# Patient Record
Sex: FEMALE | Race: BLACK OR AFRICAN AMERICAN | HISPANIC OR LATINO | Employment: FULL TIME | URBAN - METROPOLITAN AREA
[De-identification: names, ages, dates, MRNs, and addresses within clinical notes are randomized per-mention and may not be internally consistent; named-entity substitution may affect disease eponyms.]

---

## 2023-02-15 ENCOUNTER — TELEPHONE (OUTPATIENT)
Dept: FAMILY MEDICINE | Facility: CLINIC | Age: 73
End: 2023-02-15

## 2023-02-15 NOTE — TELEPHONE ENCOUNTER
"[employer]  Transfer Triage Note    Date of call: February 15, 2023  Time of call: 5:49 PM    Current Patient Location: [facility]  Current Level of Care: ED    Vitals: /80 HR: 70s O2 Sats: 96% on RA  Diagnosis: Septic symphysis pubis?  Is COVID-19 a concern? No  Reason for requested transfer: Procedure can be done here and not at referring hospital   Isolation Needs: None    Outside Records: Not available  Additional records may be faxed to [phone].    Transfer accepted: No.    Rationale for declining transfer or suggested follow-up: Will follow-up for MR results and discuss with IR as below    Recommendations for Management and Stabilization: Given    Additional Comments:     73yo relatively healthy Arabic-speaking female with PMHx of hypothyroidism, osteopetrosis, and obesity who presented to ED with inability to bear weight.  Hip pain developed in January, intermittent over the past month.  Developed worsening and persistent pain in pelvis on 2/12, radiating down bilateral medial thighs. Has been unable to work or walk for the past 4 days.  No fevers/sweats/chills, no weakness or sensory changes in legs, no bowel/bladder incontinence/retention.  Labs notable for normal WBC, elevated CRP 3.1, elevated ESR in 60s; otherwise unremarkable. CT showed erosive changes at pubic symphysis with an enhancing fluid collection (13mm x 12mm).  Concern for inflammatory/infectious etiology per radiology.     Briefly reviewed literature for \"septic pubic symphysis,\" most commonly seen in young female athletes, far less common in this age group outside urogenital surgery (pt has remote hx of hysterectomy). Discussed with Dr Marino of Orthopedics, who commented that he had not run across such an infection in his experience and is uncertain that a washout would be indicated; may start with CT guided IR fluid sample, particularly given relatively small collection.  No bed available for jayde" >24h, outside facility will plan to admit patient, start broad-spectrum antibiotics, and get MR in the AM to better characterize possible infection.      Daytime SOC triage to [facility] back on 2/16 for MR results and possible discussion with IR regarding next steps in management.     Jens Matthews MD  Internal Medicine and Pediatrics  P: [phone]      Addendum 2/16/2023 11:21AM  Discussed with Dr. De Los Santos ([facility]) and Dr. Escamilla ([facility]) about options given the MR finding of severe arthritis vs septic pubis symphysis. Fluid collection again seen and around 1cm unchanged in size. Per Dr. De Los Santos could continue antibiotics for now and pursue outpatient sampling (CT-guided with IR) versus transfer to [facility] for inpatient IR sampling. Unfortunately images were not able to be shared between [facility] system and ours; spoke again with Dr. Escamilla and she elected to treat with antibiotics alone, discharge if there is clinical improvement (in pain, ESR, CRP), with plan to see ortho as outpatient for further evaluation. At this time no plan for transfer to [facility].  Eliud Arnold, DO

## 2023-02-17 ENCOUNTER — MEDICAL CORRESPONDENCE (OUTPATIENT)
Dept: HEALTH INFORMATION MANAGEMENT | Facility: CLINIC | Age: 73
End: 2023-02-17

## 2023-02-20 ENCOUNTER — TRANSCRIBE ORDERS (OUTPATIENT)
Dept: OTHER | Age: 73
End: 2023-02-20

## 2023-02-20 DIAGNOSIS — M00.9 SEPTIC ARTHRITIS, DUE TO UNSPECIFIED ORGANISM, SEPTIC ARTHRITIS OF UNSPECIFIED LOCATION (H): Primary | ICD-10-CM

## 2023-02-23 ENCOUNTER — TELEPHONE (OUTPATIENT)
Dept: ORTHOPEDICS | Facility: CLINIC | Age: 73
End: 2023-02-23

## 2023-02-23 NOTE — TELEPHONE ENCOUNTER
Attempted to reach patient, her daughter is unable to drive her here tomorrow.  She will keep her appointment for 3/1/23 with Dr. De Los Santos.  Patient's daughter stated that patient is doing better.

## 2023-02-23 NOTE — TELEPHONE ENCOUNTER
Records received    February 23, 2023 7:45 AM  [id number]   Facility  [facility]  [address] [address]   Ph. [phone]   Outcome Imaging disc received addressed to Ortho, no reports with disc. Uploaded to PACS, no appt scheduled at this time:  1/23/23 XR HIP RT & LT  2/15/23 XR L-Spine & CT ABD PELVIS   2/16/23 MR PELVIS

## 2023-02-23 NOTE — TELEPHONE ENCOUNTER
Hello,  Im with ortho con.  Pt has an urgent referral to Dr. De Los Santos.  She is scheduled for 3/1.  Is this soon enough?  Is Dr. De Los Santos the provider she should see?  Thank you.

## 2023-02-24 NOTE — TELEPHONE ENCOUNTER
Action February 24, 2023 10:45 AM MT   Action Taken Called patient's daughter, whom helps her mother coordinate emdical care. Per daughter, her mother has no Hx of back/hip surgery and no Hx of injections. Daughter gave VBOK to update care everywhere.      DIAGNOSIS: BILATERAL HIP PAIN - LOW BACK PAIN  Septic arthritis, due to unspecified organism, septic arthritis of unspecified location (H) [M00.9]  - Primary    APPOINTMENT DATE: 03/01/2023   NOTES STATUS DETAILS   OFFICE NOTE from referring provider Jessica Montalvo MD - [facility]   OFFICE NOTE from other specialist Care Everywhere    DISCHARGE REPORT from the ER Care Everywhere 02/15/2023 -  [facility] ED   OPERATIVE REPORT Care Everywhere 06/21/2019 - Hysterectomy   MEDICATION LIST Care Everywhere    LABS     MRI PACS [facility]:  2/16/23 MR PELVIS    CT SCAN PACS [facility]:  2/15/23 XR L-Spine & CT ABD PELVIS    XRAYS (IMAGES & REPORTS) PACS [facility]:  1/23/23 XR HIP RT & LT  2/15/23 XR L-Spine

## 2023-03-01 ENCOUNTER — PRE VISIT (OUTPATIENT)
Dept: ORTHOPEDICS | Facility: CLINIC | Age: 73
End: 2023-03-01

## 2023-03-01 ENCOUNTER — OFFICE VISIT (OUTPATIENT)
Dept: ORTHOPEDICS | Facility: CLINIC | Age: 73
End: 2023-03-01
Payer: COMMERCIAL

## 2023-03-01 DIAGNOSIS — M70.61 TROCHANTERIC BURSITIS OF BOTH HIPS: Primary | ICD-10-CM

## 2023-03-01 DIAGNOSIS — M70.62 TROCHANTERIC BURSITIS OF BOTH HIPS: Primary | ICD-10-CM

## 2023-03-01 DIAGNOSIS — M00.9 SEPTIC ARTHRITIS, DUE TO UNSPECIFIED ORGANISM, SEPTIC ARTHRITIS OF UNSPECIFIED LOCATION (H): ICD-10-CM

## 2023-03-01 PROCEDURE — 99204 OFFICE O/P NEW MOD 45 MIN: CPT | Performed by: ORTHOPAEDIC SURGERY

## 2023-03-01 RX ORDER — FUROSEMIDE 20 MG
1 TABLET ORAL
COMMUNITY
Start: 2023-02-21

## 2023-03-01 RX ORDER — ACETAMINOPHEN 500 MG
1000 TABLET ORAL
COMMUNITY

## 2023-03-01 RX ORDER — ALENDRONATE SODIUM 35 MG/1
35 TABLET ORAL
COMMUNITY
Start: 2022-09-22 | End: 2024-08-14

## 2023-03-01 RX ORDER — IBUPROFEN 200 MG
TABLET ORAL
COMMUNITY
Start: 2023-02-18

## 2023-03-01 RX ORDER — SODIUM PHOSPHATE,MONO-DIBASIC 19G-7G/118
2 ENEMA (ML) RECTAL
COMMUNITY

## 2023-03-01 RX ORDER — B-COMPLEX WITH VITAMIN C
1 TABLET ORAL EVERY MORNING
COMMUNITY
Start: 2022-09-22

## 2023-03-01 RX ORDER — CALCIUM CARBONATE/VITAMIN D3 600 MG-10
1 TABLET ORAL
COMMUNITY
Start: 2022-09-22 | End: 2023-09-22

## 2023-03-01 RX ORDER — HYDROCODONE BITARTRATE AND ACETAMINOPHEN 5; 325 MG/1; MG/1
1 TABLET ORAL
COMMUNITY
Start: 2023-02-18 | End: 2024-08-14

## 2023-03-01 RX ORDER — PANTOPRAZOLE SODIUM 40 MG/1
40 TABLET, DELAYED RELEASE ORAL
COMMUNITY
Start: 2023-02-19 | End: 2023-03-21

## 2023-03-01 RX ORDER — LEVOTHYROXINE SODIUM 100 UG/1
TABLET ORAL
COMMUNITY
Start: 2022-09-22

## 2023-03-01 RX ORDER — DOXYCYCLINE 100 MG/1
CAPSULE ORAL
COMMUNITY
Start: 2023-02-18 | End: 2024-08-14

## 2023-03-01 NOTE — PROGRESS NOTES
This patient was hospitalized recently for an anterior midline pain and was found to have a fluid collection next to the pubic symphysis.  She was treated with IV antibiotics and is still on oral antibiotics for at least another 2 days.  Her anterior midline symphysis pain has largely resolved.  Today she is mostly complaining of pain over both trochanters and a long-term history of knee discomfort.  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    On examination today she is alert oriented has a normal mood and affect and is in no acute distress.  She is able to stand and walk without assistance although has some discomfort.  She is tender over the greater trochanters and proximal posterior aspect.    I reviewed her imaging studies which show fluid collection just inferior to the pubic symphysis.  I also reviewed labs showing increased but rapidly dropping C-reactive protein while she was hospitalized more than 10 days ago.    The plan is for her to complete her antibiotics.  I would like to have a phone conversation with her at least in 2 weeks to confirm that her symptoms have resolved.  I have given her a work note for her to be off work for 2 weeks then half time for 2 weeks before returning to full-time.  I have answered all of the family's questions.

## 2023-03-01 NOTE — LETTER
Return to Work  2023     Seen today: Yes    Patient:  Marixa Liriano  :   1950  MRN:     [id number]  Physician:    PASQUALE KULKARNI  VIDEO,     Marixa Liriano may return to work on Date: March 15, 2023 at 4 hours/day.  On 2023, she may return to full hours.  I saw her today in my clinic and she is recovering from her recent hospitalization but still needs medical treatment and therapy..    Sincerely,              Electronically signed by Pasquale Kulkarni MD

## 2023-03-01 NOTE — NURSING NOTE
Reason For Visit:   Chief Complaint   Patient presents with     Consult     Bilateral hip pain referred by Dr. Jessica Callejas at [facility] // pt reports bilateral legs swelling        There were no vitals taken for this visit.-pt in wheel chair      Pain Assessment  Patient Currently in Pain: Yes  0-10 Pain Scale: 5  Primary Pain Location: Hip (bilateral)      Gonzalo Ziegler ATC

## 2023-03-08 ENCOUNTER — THERAPY VISIT (OUTPATIENT)
Dept: PHYSICAL THERAPY | Facility: CLINIC | Age: 73
End: 2023-03-08
Attending: ORTHOPAEDIC SURGERY
Payer: COMMERCIAL

## 2023-03-08 DIAGNOSIS — M70.62 TROCHANTERIC BURSITIS OF BOTH HIPS: ICD-10-CM

## 2023-03-08 DIAGNOSIS — M70.61 TROCHANTERIC BURSITIS OF BOTH HIPS: ICD-10-CM

## 2023-03-08 DIAGNOSIS — M25.552 BILATERAL HIP PAIN: ICD-10-CM

## 2023-03-08 DIAGNOSIS — M25.551 BILATERAL HIP PAIN: ICD-10-CM

## 2023-03-08 PROCEDURE — 97161 PT EVAL LOW COMPLEX 20 MIN: CPT | Mod: GP | Performed by: PHYSICAL THERAPIST

## 2023-03-08 PROCEDURE — 97110 THERAPEUTIC EXERCISES: CPT | Mod: GP | Performed by: PHYSICAL THERAPIST

## 2023-03-08 NOTE — PROGRESS NOTES
Physical Therapy Initial Evaluation  Subjective:  The history is provided by the patient. A  was used.   Patient Health History  Marixa Liriano being seen for bilateral hip pain.     Date of Onset: 3-8-22.   Problem occurred: unknown   Pain is reported as 4/10 on pain scale.  General health as reported by patient is fair.  Pertinent medical history includes: menopausal, osteoarthritis and thyroid problems.   Red flags:  None as reported by patient.  Medical allergies: none.   Surgeries include:  Orthopedic surgery.    Current medications:  Anti-inflammatory, bone density and thyroid medication.    Current occupation is .   Primary job tasks include:  Prolonged standing and repetitive tasks.                  Therapist Generated HPI Evaluation  Problem details: Has had B hip pain for more than one year. MD orders dated 3-1-23. Over past 2 months the pain has worsened. Per chart review: Urgent care visit on 1-19-23 for B hip pain. ED visit and admitted to hospital 2-15-23 for B hip pain, unable to walk. (Severe pain in pubic symphysis area). MRI showed septic joint. Pt placed on antibiotics and pain meds which helped.  She works standing on her feet greater than 8 hours. The pain is lateral hip, groin, pubic symphysis, L>R anterior and posterior thighs, lower leg L>R, N/T in L>R feet. Walking with cane and standing tolerance is 5-10 minutes.         Type of problem:  Bilateral hips.    This is a chronic condition.  Condition occurred with:  Insidious onset.  Where condition occurred: for unknown reasons.  Patient reports pain:  Posterior and groin.  Pain is described as aching and is intermittent.  Pain radiates to:  Thigh and lower leg (L>R). Pain is worse during the day.  Since onset symptoms are unchanged.  Associated symptoms:  Numbness and tingling (L>R feet). Symptoms are exacerbated by walking, standing, ascending stairs, bending/squatting and lying on extremity  and relieved by  rest.      Restrictions due to condition include:  Currently not working due to present treatment (until 3-15-23).  Barriers include:  None as reported by patient.                        Objective:    Gait:    Assistive Devices:  Cane  Deviations:  General Deviations:  Felipa decr, stance time decr and stride length decr               Lumbar/SI Evaluation  ROM:    AROM Lumbar:   Flexion:          Hands to floor  Ext:                    75%   Side Bend:        Left:     Right:   Rotation:           Left:     Right:   Side Glide:        Left:  25%    Right:  25%                                                              Hip Evaluation  Hip PROM:  Hip PROM:  Left Hip:    Normal (PDM)  Right Hip:  Normal (PDM)                          Hip Strength:  Hip Strength:    Left:    Normal (pain in lateral hips and pubis symphysis)  Right:  Normal (pain in lateral hips and pubis symphysis)                          Hip Special Testing:      Left hip negative for the following special tests:  Gayle; Fadir/Labrum or SLR  Right hip negative for the following special tests:  Gayle; Fadir/Labrum or SLR                   Lakisha Lumbar Evaluation    Posture:  Sitting: poor  Standing: fair  Lordosis: WNL  Lateral Shift: no  Correction of Posture: better      Test Movements:    EIS: During: no effect    Repeat EIS: During: no effect  After: better  Mechanical Response: IncROM                                                     ROS    Assessment/Plan:    Patient is a 72 year old female with both sides hip complaints.  KATE decreased pain and increased motion. Will trial 5x/day over next week and assess sx's and function. She lives out of town and hopes to get several sessions in before she has to leave.    Patient has the following significant findings with corresponding treatment plan.                Diagnosis 1:  B hip pain  Pain -  manual therapy, self management, education, directional preference exercise and home  program  Decreased ROM/flexibility - manual therapy and therapeutic exercise  Impaired gait - gait training  Decreased function - therapeutic activities  Impaired posture - neuro re-education    Therapy Evaluation Codes:   Cumulative Therapy Evaluation is: Low complexity.    Previous and current functional limitations:  (See Goal Flow Sheet for this information)    Short term and Long term goals: (See Goal Flow Sheet for this information)     Communication ability:  Patient has an  for communication clarity.  Treatment Explanation - The following has been discussed with the patient:   RX ordered/plan of care  Anticipated outcomes  Possible risks and side effects  This patient would benefit from PT intervention to resume normal activities.   Rehab potential is good.    Frequency:  2 X week, once daily  Duration:  for 2 weeks  Discharge Plan:    Independent in home treatment program.    Please refer to the daily flowsheet for treatment today, total treatment time and time spent performing 1:1 timed codes.

## 2023-03-15 ENCOUNTER — VIRTUAL VISIT (OUTPATIENT)
Dept: ORTHOPEDICS | Facility: CLINIC | Age: 73
End: 2023-03-15
Payer: COMMERCIAL

## 2023-03-15 ENCOUNTER — THERAPY VISIT (OUTPATIENT)
Dept: PHYSICAL THERAPY | Facility: CLINIC | Age: 73
End: 2023-03-15
Payer: COMMERCIAL

## 2023-03-15 DIAGNOSIS — M00.9 SEPTIC ARTHRITIS, DUE TO UNSPECIFIED ORGANISM, SEPTIC ARTHRITIS OF UNSPECIFIED LOCATION (H): ICD-10-CM

## 2023-03-15 DIAGNOSIS — M70.62 TROCHANTERIC BURSITIS OF BOTH HIPS: Primary | ICD-10-CM

## 2023-03-15 DIAGNOSIS — M25.551 BILATERAL HIP PAIN: Primary | ICD-10-CM

## 2023-03-15 DIAGNOSIS — M70.61 TROCHANTERIC BURSITIS OF BOTH HIPS: Primary | ICD-10-CM

## 2023-03-15 DIAGNOSIS — M25.552 BILATERAL HIP PAIN: Primary | ICD-10-CM

## 2023-03-15 PROCEDURE — 99213 OFFICE O/P EST LOW 20 MIN: CPT | Mod: 95 | Performed by: ORTHOPAEDIC SURGERY

## 2023-03-15 PROCEDURE — 97112 NEUROMUSCULAR REEDUCATION: CPT | Mod: GP | Performed by: PHYSICAL THERAPIST

## 2023-03-15 PROCEDURE — 97110 THERAPEUTIC EXERCISES: CPT | Mod: GP | Performed by: PHYSICAL THERAPIST

## 2023-03-15 PROCEDURE — 97140 MANUAL THERAPY 1/> REGIONS: CPT | Mod: GP | Performed by: PHYSICAL THERAPIST

## 2023-03-15 NOTE — LETTER
Date:March 16, 2023      Provider requested that no letter be sent. Do not send.       [facility]

## 2023-03-15 NOTE — PROGRESS NOTES
This patient is gradually improving and is looking forward to returning to work.  She is doing some physical therapy and still has some discomfort but pain overall is better in the hips and the front of the pelvis.    Over the phone she was alert and in no obvious distress.  She has many good questions and I answered all of her questions.  I reviewed her recent CT scan and she has no sign of hip arthritis but just this process in the symphysis pubis.  There is no abnormality over the greater trochanter to explain her bursitis or tendinopathy there.    I have advised her to continue with physical therapy for at least a month as she returns to work.  I have given her our number to call if she has additional problems or wants to be seen to discuss a possible greater trochanteric bursa injection.  I have answered all her questions today.    I spoke with this patient on the phone for approximately 8 minutes.  I spent another 5 minutes doing image review and charting

## 2023-03-15 NOTE — PROGRESS NOTES
Subjective:  HPI  Physical Exam                    Objective:  System    Physical Exam    General     ROS    Assessment/Plan:    PROGRESS  REPORT    Progress reporting period is from 3-8-23 to 3-15-23. Patient seen for 2 visits and is returning to her home town.       SUBJECTIVE  Subjective changes noted by patient:  Doing the KATE exercise and is much better. Able to walk without cane now. Still gets some pain in left hip to left heel but much better. Occasionaly in R leg, occasional tingling in bottom of left foot. She will be moving back to [place], [place] and will continue her care there.    Current Pain level: 1/10.      Initial Pain level: 4/10.   Changes in function:  Yes (See Goal flowsheet attached for changes in current functional level)  Adverse reaction to treatment or activity: None    OBJECTIVE  Changes noted in objective findings:  Yes,   LAROM: flex=hands to floor, ext=90% (improved from 75%).   Significant hypertonicity of B lumbar paraspinals that improved after STM and mobs.   Added strengthening.     ASSESSMENT/PLAN  Updated problem list and treatment plan: Diagnosis 1:  B hip pain  Pain -  manual therapy, self management, education, directional preference exercise and home program  Decreased ROM/flexibility - manual therapy and therapeutic exercise  Decreased function - therapeutic activities  Impaired posture - neuro re-education  STG/LTGs have been met or progress has been made towards goals:  Yes (See Goal flow sheet completed today.)  Assessment of Progress: The patient's condition is improving.  Patient is meeting short term goals and is progressing towards long term goals.  Self Management Plans:  Patient has been instructed in a home treatment program.  Patient  has been instructed in self management of symptoms.  I have re-evaluated this patient and find that the nature, scope, duration and intensity of the therapy is appropriate for the medical condition of the patient.  Marixa continues to  require the following intervention to meet STG and LTG's:  Patient will pursue PT care in [place].    Recommendations:  This patient is ready to be discharged from therapy and continue their home treatment program.  And will continue care in her home town.    Please refer to the daily flowsheet for treatment today, total treatment time and time spent performing 1:1 timed codes.

## 2023-03-15 NOTE — LETTER
3/15/2023         RE: Marixa Liriano  [address]  [place] [facility] [place] [address]        Dear Colleague,    Thank you for referring your patient, Marixa Liriano, to the [facility] [place]. Please see a copy of my visit note below.    This patient is gradually improving and is looking forward to returning to work.  She is doing some physical therapy and still has some discomfort but pain overall is better in the hips and the front of the pelvis.    Over the phone she was alert and in no obvious distress.  She has many good questions and I answered all of her questions.  I reviewed her recent CT scan and she has no sign of hip arthritis but just this process in the symphysis pubis.  There is no abnormality over the greater trochanter to explain her bursitis or tendinopathy there.    I have advised her to continue with physical therapy for at least a month as she returns to work.  I have given her our number to call if she has additional problems or wants to be seen to discuss a possible greater trochanteric bursa injection.  I have answered all her questions today.    I spoke with this patient on the phone for approximately 8 minutes.  I spent another 5 minutes doing image review and charting      Again, thank you for allowing me to participate in the care of your patient.        Sincerely,        Hugh De Los Santos MD

## 2023-03-24 ENCOUNTER — TELEPHONE (OUTPATIENT)
Dept: ORTHOPEDICS | Facility: CLINIC | Age: 73
End: 2023-03-24
Payer: COMMERCIAL

## 2023-03-24 NOTE — TELEPHONE ENCOUNTER
[facility] Call Center    Phone Message    May a detailed message be left on voicemail: yes     Reason for Call: Other: If patient needs PT can the order be faxed to [facility] fax [phone]. There is not an order so unsure      Action Taken: Other: ortho csc    Travel Screening: Not Applicable

## 2023-04-05 ENCOUNTER — TRANSFERRED RECORDS (OUTPATIENT)
Dept: HEALTH INFORMATION MANAGEMENT | Facility: CLINIC | Age: 73
End: 2023-04-05
Payer: COMMERCIAL

## 2023-10-13 ENCOUNTER — TRANSFERRED RECORDS (OUTPATIENT)
Dept: MULTI SPECIALTY CLINIC | Facility: CLINIC | Age: 73
End: 2023-10-13

## 2024-08-14 ENCOUNTER — OFFICE VISIT (OUTPATIENT)
Dept: FAMILY MEDICINE | Facility: CLINIC | Age: 74
End: 2024-08-14
Payer: COMMERCIAL

## 2024-08-14 ENCOUNTER — HOSPITAL ENCOUNTER (OUTPATIENT)
Dept: ULTRASOUND IMAGING | Facility: CLINIC | Age: 74
Discharge: HOME | End: 2024-08-14
Attending: PHYSICIAN ASSISTANT | Admitting: PHYSICIAN ASSISTANT
Payer: COMMERCIAL

## 2024-08-14 VITALS
HEART RATE: 84 BPM | RESPIRATION RATE: 18 BRPM | SYSTOLIC BLOOD PRESSURE: 112 MMHG | BODY MASS INDEX: 34.02 KG/M2 | HEIGHT: 63 IN | WEIGHT: 192 LBS | TEMPERATURE: 96.9 F | DIASTOLIC BLOOD PRESSURE: 67 MMHG | OXYGEN SATURATION: 98 %

## 2024-08-14 DIAGNOSIS — M79.605 LEFT LEG PAIN: Primary | ICD-10-CM

## 2024-08-14 DIAGNOSIS — R60.0 LOWER EXTREMITY EDEMA: ICD-10-CM

## 2024-08-14 PROCEDURE — 93971 EXTREMITY STUDY: CPT | Mod: LT

## 2024-08-14 PROCEDURE — 99203 OFFICE O/P NEW LOW 30 MIN: CPT | Performed by: PHYSICIAN ASSISTANT

## 2024-08-14 RX ORDER — FUROSEMIDE 20 MG
20 TABLET ORAL DAILY PRN
Qty: 30 TABLET | Refills: 0 | Status: SHIPPED | OUTPATIENT
Start: 2024-08-14

## 2024-08-14 ASSESSMENT — PATIENT HEALTH QUESTIONNAIRE - PHQ9
10. IF YOU CHECKED OFF ANY PROBLEMS, HOW DIFFICULT HAVE THESE PROBLEMS MADE IT FOR YOU TO DO YOUR WORK, TAKE CARE OF THINGS AT HOME, OR GET ALONG WITH OTHER PEOPLE: SOMEWHAT DIFFICULT
SUM OF ALL RESPONSES TO PHQ QUESTIONS 1-9: 4
SUM OF ALL RESPONSES TO PHQ QUESTIONS 1-9: 4

## 2024-08-14 ASSESSMENT — PAIN SCALES - GENERAL: PAINLEVEL: MODERATE PAIN (5)

## 2024-08-14 ASSESSMENT — ENCOUNTER SYMPTOMS: LEG PAIN: 1

## 2024-08-14 NOTE — PROGRESS NOTES
"HPI: Marixa is a Irish speaking female here with dtr Marixa for L calf pain x 8 days  Dtr serves as interpretor  She hx of recent travel to [facility] (home now for about 20 days)  Denies personal or FH blood clots  Walking makes pain worse and elevating legs helps  Denies chest pain or acute sob  Denies cough or fever  [place] in [place]  She has lasix on the list to be taken prn for leg swelling but she is not taking any of that.      Past Medical History:   Diagnosis Date    Hypothyroidism     Osteoarthritis      Past Surgical History:   Procedure Laterality Date    HYSTERECTOMY       Social History     Tobacco Use    Smoking status: Never    Smokeless tobacco: Never   Substance Use Topics    Alcohol use: Never     Current Outpatient Medications   Medication Sig Dispense Refill    acetaminophen (TYLENOL) 500 MG tablet Take 1,000 mg by mouth      diclofenac (VOLTAREN) 1 % topical gel APPLY 2-4 GRAMS TOPICALLY IF NEEDED IN THE MORNING NOON EVENING AND BEFORE BEDTIME. APPLY 2G TO JOINTS OF UPPER EXTREMITY AND 4G TO LOWER EX      furosemide (LASIX) 20 MG tablet Take 1 tablet by mouth daily at 2 pm      glucosamine-chondroitin 500-400 MG CAPS per capsule Take 2 capsules by mouth      ibuprofen (ADVIL/MOTRIN) 200 MG tablet TAKE 3 TABLETS (600 MG) BY MOUTH EVERY 8 HOURS FOR 5 DAYS.      levothyroxine (SYNTHROID/LEVOTHROID) 100 MCG tablet TAKE 1 TABLET (100 MCG) BY MOUTH IN THE MORNING.      VITAMIN  B COMPLEX tablet Take 1 tablet by mouth every morning       Allergies   Allergen Reactions    Naproxen Dizziness and Nausea and Vomiting    Other Drug Allergy (See Comments) Muscle Pain (Myalgia)       PHYSICAL EXAM:    /67 (BP Location: Right arm, Patient Position: Sitting, Cuff Size: Adult Regular)   Pulse 84   Temp 96.9  F (36.1  C) (Temporal)   Resp 18   Ht 1.6 m (5' 3\")   Wt 87.1 kg (192 lb)   LMP  (LMP Unknown)   SpO2 98%   BMI 34.01 kg/m      Patient appears " non toxic  Lungs: CTA bilat  Heart: RRR  L calf visibly larger than R and tight feeling  No erythema or palp cord  Normal gait    US neg for DVT (Call report)    Assessment and Plan:     (L41.051) Left leg pain  (primary encounter diagnosis)  Comment: no DVT. Recd she restart lasix 20mg every day x 5-7d, wearing compression knee high stocking and elevate legs. Return for recheck one week.  Plan: US Lower Extremity Venous Duplex Left              Anel Giron PA-C

## 2024-08-14 NOTE — RESULT ENCOUNTER NOTE
Discussed with pt's dtr who was her interpretor at the visit.  Recd lasix, compression hose and leg elevation

## 2024-10-18 ENCOUNTER — OFFICE VISIT (OUTPATIENT)
Dept: FAMILY MEDICINE | Facility: CLINIC | Age: 74
End: 2024-10-18
Payer: MEDICARE

## 2024-10-18 ENCOUNTER — HOSPITAL ENCOUNTER (EMERGENCY)
Facility: CLINIC | Age: 74
Discharge: HOME | End: 2024-10-18
Attending: EMERGENCY MEDICINE | Admitting: EMERGENCY MEDICINE
Payer: COMMERCIAL

## 2024-10-18 ENCOUNTER — APPOINTMENT (OUTPATIENT)
Dept: ULTRASOUND IMAGING | Facility: CLINIC | Age: 74
End: 2024-10-18
Attending: EMERGENCY MEDICINE
Payer: COMMERCIAL

## 2024-10-18 ENCOUNTER — APPOINTMENT (OUTPATIENT)
Dept: CT IMAGING | Facility: CLINIC | Age: 74
End: 2024-10-18
Attending: EMERGENCY MEDICINE
Payer: COMMERCIAL

## 2024-10-18 ENCOUNTER — ANCILLARY PROCEDURE (OUTPATIENT)
Dept: GENERAL RADIOLOGY | Facility: CLINIC | Age: 74
End: 2024-10-18
Attending: PHYSICIAN ASSISTANT
Payer: COMMERCIAL

## 2024-10-18 VITALS
OXYGEN SATURATION: 99 % | HEART RATE: 80 BPM | RESPIRATION RATE: 14 BRPM | SYSTOLIC BLOOD PRESSURE: 146 MMHG | TEMPERATURE: 97.9 F | DIASTOLIC BLOOD PRESSURE: 76 MMHG

## 2024-10-18 VITALS
TEMPERATURE: 98.1 F | SYSTOLIC BLOOD PRESSURE: 115 MMHG | DIASTOLIC BLOOD PRESSURE: 71 MMHG | HEIGHT: 63 IN | RESPIRATION RATE: 16 BRPM | HEART RATE: 94 BPM | BODY MASS INDEX: 33.43 KG/M2 | WEIGHT: 188.7 LBS | OXYGEN SATURATION: 96 %

## 2024-10-18 DIAGNOSIS — R06.02 SHORTNESS OF BREATH: ICD-10-CM

## 2024-10-18 DIAGNOSIS — R91.1 RIGHT UPPER LOBE PULMONARY NODULE: ICD-10-CM

## 2024-10-18 DIAGNOSIS — J06.9 VIRAL URI: Primary | ICD-10-CM

## 2024-10-18 DIAGNOSIS — M71.22 SYNOVIAL CYST OF LEFT POPLITEAL SPACE: ICD-10-CM

## 2024-10-18 DIAGNOSIS — R06.02 SHORTNESS OF BREATH: Primary | ICD-10-CM

## 2024-10-18 LAB
ANION GAP SERPL CALCULATED.3IONS-SCNC: 10 MMOL/L (ref 7–15)
ANION GAP SERPL CALCULATED.3IONS-SCNC: 7 MMOL/L (ref 7–15)
ATRIAL RATE - MUSE: 82 BPM
BUN SERPL-MCNC: 14.8 MG/DL (ref 8–23)
BUN SERPL-MCNC: 15.6 MG/DL (ref 8–23)
CALCIUM SERPL-MCNC: 9.2 MG/DL (ref 8.8–10.4)
CALCIUM SERPL-MCNC: 9.4 MG/DL (ref 8.8–10.4)
CHLORIDE SERPL-SCNC: 103 MMOL/L (ref 98–107)
CHLORIDE SERPL-SCNC: 104 MMOL/L (ref 98–107)
CREAT SERPL-MCNC: 0.66 MG/DL (ref 0.51–0.95)
CREAT SERPL-MCNC: 0.73 MG/DL (ref 0.51–0.95)
D DIMER PPP FEU-MCNC: 1.37 UG/ML FEU (ref 0–0.5)
DIASTOLIC BLOOD PRESSURE - MUSE: NORMAL MMHG
EGFRCR SERPLBLD CKD-EPI 2021: 85 ML/MIN/1.73M2
EGFRCR SERPLBLD CKD-EPI 2021: >90 ML/MIN/1.73M2
ERYTHROCYTE [DISTWIDTH] IN BLOOD BY AUTOMATED COUNT: 15.9 % (ref 10–15)
GLUCOSE SERPL-MCNC: 108 MG/DL (ref 70–99)
GLUCOSE SERPL-MCNC: 98 MG/DL (ref 70–99)
HCO3 SERPL-SCNC: 27 MMOL/L (ref 22–29)
HCO3 SERPL-SCNC: 28 MMOL/L (ref 22–29)
HCT VFR BLD AUTO: 34.6 % (ref 35–47)
HGB BLD-MCNC: 11 G/DL (ref 11.7–15.7)
INTERPRETATION ECG - MUSE: NORMAL
MAGNESIUM SERPL-MCNC: 2.1 MG/DL (ref 1.7–2.3)
MCH RBC QN AUTO: 26.9 PG (ref 26.5–33)
MCHC RBC AUTO-ENTMCNC: 31.8 G/DL (ref 31.5–36.5)
MCV RBC AUTO: 85 FL (ref 78–100)
NT-PROBNP SERPL-MCNC: 65 PG/ML (ref 0–900)
P AXIS - MUSE: 56 DEGREES
PLATELET # BLD AUTO: 195 10E3/UL (ref 150–450)
POTASSIUM SERPL-SCNC: 3.8 MMOL/L (ref 3.4–5.3)
POTASSIUM SERPL-SCNC: 4.1 MMOL/L (ref 3.4–5.3)
PR INTERVAL - MUSE: 146 MS
QRS DURATION - MUSE: 72 MS
QT - MUSE: 332 MS
QTC - MUSE: 387 MS
R AXIS - MUSE: 19 DEGREES
RBC # BLD AUTO: 4.09 10E6/UL (ref 3.8–5.2)
SODIUM SERPL-SCNC: 139 MMOL/L (ref 135–145)
SODIUM SERPL-SCNC: 140 MMOL/L (ref 135–145)
SYSTOLIC BLOOD PRESSURE - MUSE: NORMAL MMHG
T AXIS - MUSE: 71 DEGREES
TROPONIN T SERPL HS-MCNC: 6 NG/L
TROPONIN T SERPL HS-MCNC: 7 NG/L
VENTRICULAR RATE- MUSE: 82 BPM
WBC # BLD AUTO: 6 10E3/UL (ref 4–11)

## 2024-10-18 PROCEDURE — 84484 ASSAY OF TROPONIN QUANT: CPT | Performed by: EMERGENCY MEDICINE

## 2024-10-18 PROCEDURE — 71046 X-RAY EXAM CHEST 2 VIEWS: CPT | Mod: TC | Performed by: RADIOLOGY

## 2024-10-18 PROCEDURE — 99214 OFFICE O/P EST MOD 30 MIN: CPT | Performed by: PHYSICIAN ASSISTANT

## 2024-10-18 PROCEDURE — 99291 CRITICAL CARE FIRST HOUR: CPT | Mod: 25

## 2024-10-18 PROCEDURE — 71275 CT ANGIOGRAPHY CHEST: CPT | Mod: MA

## 2024-10-18 PROCEDURE — 99285 EMERGENCY DEPT VISIT HI MDM: CPT | Mod: 25

## 2024-10-18 PROCEDURE — 250N000011 HC RX IP 250 OP 636: Performed by: EMERGENCY MEDICINE

## 2024-10-18 PROCEDURE — 80048 BASIC METABOLIC PNL TOTAL CA: CPT | Performed by: EMERGENCY MEDICINE

## 2024-10-18 PROCEDURE — 93971 EXTREMITY STUDY: CPT | Mod: LT

## 2024-10-18 PROCEDURE — 85379 FIBRIN DEGRADATION QUANT: CPT | Performed by: PHYSICIAN ASSISTANT

## 2024-10-18 PROCEDURE — 83880 ASSAY OF NATRIURETIC PEPTIDE: CPT | Performed by: EMERGENCY MEDICINE

## 2024-10-18 PROCEDURE — 83735 ASSAY OF MAGNESIUM: CPT | Performed by: EMERGENCY MEDICINE

## 2024-10-18 PROCEDURE — 85027 COMPLETE CBC AUTOMATED: CPT | Performed by: PHYSICIAN ASSISTANT

## 2024-10-18 PROCEDURE — 250N000009 HC RX 250: Performed by: EMERGENCY MEDICINE

## 2024-10-18 PROCEDURE — 36415 COLL VENOUS BLD VENIPUNCTURE: CPT | Performed by: EMERGENCY MEDICINE

## 2024-10-18 PROCEDURE — 36415 COLL VENOUS BLD VENIPUNCTURE: CPT | Performed by: PHYSICIAN ASSISTANT

## 2024-10-18 PROCEDURE — 80048 BASIC METABOLIC PNL TOTAL CA: CPT | Performed by: PHYSICIAN ASSISTANT

## 2024-10-18 PROCEDURE — 93000 ELECTROCARDIOGRAM COMPLETE: CPT | Performed by: PHYSICIAN ASSISTANT

## 2024-10-18 RX ORDER — IOPAMIDOL 755 MG/ML
70 INJECTION, SOLUTION INTRAVASCULAR ONCE
Status: COMPLETED | OUTPATIENT
Start: 2024-10-18 | End: 2024-10-18

## 2024-10-18 RX ORDER — FUROSEMIDE 20 MG/1
20 TABLET ORAL
Qty: 90 TABLET | Refills: 0 | Status: SHIPPED | OUTPATIENT
Start: 2024-10-18

## 2024-10-18 RX ORDER — POTASSIUM CHLORIDE 1500 MG/1
20 TABLET, EXTENDED RELEASE ORAL 2 TIMES DAILY
Qty: 20 TABLET | Refills: 0 | Status: SHIPPED | OUTPATIENT
Start: 2024-10-18

## 2024-10-18 RX ADMIN — SODIUM CHLORIDE 94 ML: 9 INJECTION, SOLUTION INTRAVENOUS at 20:05

## 2024-10-18 RX ADMIN — IOPAMIDOL 70 ML: 755 INJECTION, SOLUTION INTRAVENOUS at 20:06

## 2024-10-18 ASSESSMENT — PAIN SCALES - GENERAL: PAINLEVEL: MODERATE PAIN (5)

## 2024-10-18 ASSESSMENT — ACTIVITIES OF DAILY LIVING (ADL)
ADLS_ACUITY_SCORE: 35

## 2024-10-18 NOTE — ED TRIAGE NOTES
Pt received a call this afternoon that her x-ray results from this morning came back showing a blood clot and she should return immediately. Pain 3/10 in triage in back, mild shortness of breath.      Triage Assessment (Adult)       Row Name 10/18/24 4078          Triage Assessment    Airway WDL WDL        Respiratory WDL    Respiratory WDL X;all     Rhythm/Pattern, Respiratory shortness of breath        Cognitive/Neuro/Behavioral WDL    Cognitive/Neuro/Behavioral WDL WDL

## 2024-10-18 NOTE — PROGRESS NOTES
Assessment and Plan:     (J06.9) Viral URI  (primary encounter diagnosis)  Comment: onset about a month ago, continues to have congestion, frontal headache and facial pressure, no fever, symptoms have been improving, hasn't tested for covid  Plan: continue symptomatic cares, CXR to r/o secondary pneumonia    (R06.02) Shortness of breath  Comment: onset in last month or so, had cough initially which has improved, on exam she is diminished bilat with mild wheeze but is moving air well, she does have bilat lower ext edema which per her report is unchanged, systolic murmur on exam, she denies chest pain, weight appears stable, last echo appears to be 2015, don't see the report   Plan: CBC with platelets, Basic metabolic panel  (Ca,        Cl, CO2, Creat, Gluc, K, Na, BUN), D dimer,         quantitative, XR Chest 2 Views, EKG 12-lead         complete w/read - Clinics, furosemide (LASIX)         20 MG tablet, potassium chloride adolfo ER         (KLOR-CON M20) 20 MEQ CR tablet, Echocardiogram        Complete        Increase lasix to 40mg daily x 3 days, take postassium while on increased dose  See me in one week  To ED if worsens     [facility] Same Day Provider   32 minutes on the day of the encounter doing chart review, history and exam, documentation and further activities as noted above.      Marcelino Calvin is a 74 year old, presenting for the following health issues:  Headache    HPI     Marixa is here for congestion, frontal headache and facial pain x one week  She is getting over a cough which started about a month ago  She denies fever/chills    She also feels fatigue and short of breath x one month, worsens with going up stairs     She denies chest pain    She has chronic unchanged edema in bilat lower ext     She denies history of asthma but suspects she could have it    She quit smoking over 40 years ago    She hasn't tested for covid       Objective    /71 (BP Location: Right arm,  "Patient Position: Sitting, Cuff Size: Adult Large)   Pulse 94   Temp 98.1  F (36.7  C) (Oral)   Resp 16   Ht 1.6 m (5' 3\")   Wt 85.6 kg (188 lb 11.2 oz)   LMP  (LMP Unknown)   SpO2 96%   BMI 33.43 kg/m    Body mass index is 33.43 kg/m .    Physical Exam             EXAM:  GENERAL APPEARANCE: healthy, alert and no distress  EYES: Eyes grossly normal to inspection  HENT: ear canals and TMs normal and nose and mouth without ulcers or lesions, oropharynx is clear without exudate or erythema, no tonsillar swelling  NECK: suppl,e no adenopathy, no asymmetry, masses  RESP: mildly diminished bilat, mild exp wheeze, moving air well, normal WOB/speaking in full sentences  CV: regular rates and rhythm, faint systolic murmur  EXT: no moderate pitting edema bilat lower ext, no open sores, calves are non-tender    Signed Electronically by: Sigrid Albrecht PA-C    "

## 2024-10-18 NOTE — PATIENT INSTRUCTIONS
Increase lasix from 20mg to 40mg (two tablets) daily for three days then go down to 20mg daily and stay at that dose    Take potassium while on increased lasix dose    Labs and chest x-ray today     See me again in one week    Please call to schedule your echocardiogram

## 2024-10-18 NOTE — ED PROVIDER NOTES
Emergency Department Note      History of Present Illness     Chief Complaint   Abnormal Labs    HPI   Marixa Liriano is a 74 year old female with a history of hypothyroidism and osteoarthritis who presents with shortness of breath for 1 month.  Patient notes that the shortness of breath is worse with exertion.  She does note that she had a viral infection several weeks ago but has been feeling improved since.  She went to an urgent care today and had some initial blood work done.  She had a elevated D-dimer and so was sent to the emergency department for further evaluation of the shortness of breath.  She had a chest x-ray performed earlier today that showed no evidence of pneumonia.  She notes the chest pain on the right side of the chest wall has been intermittent in nature over the last 1 week.  The pain is worse with any palpation.  There is been no rash.  Patient notes that she is on a diuretic medication given some chronic swelling in her left lower extremity.  She has had no changes to these medications recently.  She denies having a primary care doctor but states that they were going to set her up with a primary care provider next Friday.  She denies any abdominal pain, vomiting or nausea.  Denies fever and constitutional symptoms.  Does note some chronic left lower extremity swelling that seems improved from August but still noticeable.  She has been using compressive stockings for the left lower extremity.    Independent Historian   None    Review of External Notes   Reviewed patient's progress note from Anel Giron PA-C on 8/14/2024, patient was seen for left calf pain for 8 days.  She had recent travel to [place] in [place].  No personal history of blood clots.  Had an ultrasound of the left lower extremity which showed no evidence of DVT.    Patient was seen today for shortness of breath and viral URI.  She had basic labs sent out including CBC and BMP.  She also had a D-dimer sent.  CBC showed white  blood cell count 6.0.  D-dimer elevated at 1.37.  Seen by Risa Albrecht PA-C.  10/18/2024.  Onset of viral URI 1 month ago.  Ongoing congestion and frontal headache and facial pressure.  Had a chest x-ray that was negative for pneumonia.  Patient has had shortness of breath as well for the last 1 month.    Past Medical History     Medical History and Problem List   Past Medical History:   Diagnosis Date    Hypothyroidism     Osteoarthritis        Medications   acetaminophen (TYLENOL) 500 MG tablet  diclofenac (VOLTAREN) 1 % topical gel  furosemide (LASIX) 20 MG tablet  glucosamine-chondroitin 500-400 MG CAPS per capsule  ibuprofen (ADVIL/MOTRIN) 200 MG tablet  levothyroxine (SYNTHROID/LEVOTHROID) 100 MCG tablet  potassium chloride adolfo ER (KLOR-CON M20) 20 MEQ CR tablet  VITAMIN  B COMPLEX tablet        Surgical History   Past Surgical History:   Procedure Laterality Date    HYSTERECTOMY         Physical Exam     Patient Vitals for the past 24 hrs:   BP Temp Temp src Pulse Resp SpO2   10/18/24 1940 -- -- -- -- -- 98 %   10/18/24 1939 (!) 147/80 -- -- 81 -- --   10/18/24 1737 132/73 97.9  F (36.6  C) Temporal 107 14 97 %     Physical Exam  General: Alert, appears well-developed and well-nourished. Cooperative.     In mild distress  HEENT:  Head:  Atraumatic  Ears:  External ears are normal  Mouth/Throat:  Oropharynx is without erythema or exudate and mucous membranes are moist.   Eyes:   Conjunctivae normal and EOM are normal. No scleral icterus.  CV:  Normal rate, regular rhythm, normal heart sounds and radial pulses are 2+ and symmetric.  No murmur.  Resp:  Breath sounds are clear bilaterally.  No wheezing.    Non-labored, no retractions or accessory muscle use  GI:  Abdomen is soft, no distension, no tenderness. No rebound or guarding.  No CVA tenderness bilaterally  MS:  Normal range of motion. No edema.    Right chest wall is mildly tender to palpation.  No crepitus.     Normal strength in all 4 extremities.      Back atraumatic.    No midline cervical, thoracic, or lumbar tenderness  Skin:  Warm and dry.  No rash or lesions noted.  Neuro:   Alert. Normal strength.  GCS: 15  Psych: Normal mood and affect.    Diagnostics     Lab Results   Labs Ordered and Resulted from Time of ED Arrival to Time of ED Departure   BASIC METABOLIC PANEL - Normal       Result Value    Sodium 140      Potassium 4.1      Chloride 103      Carbon Dioxide (CO2) 27      Anion Gap 10      Urea Nitrogen 14.8      Creatinine 0.73      GFR Estimate 85      Calcium 9.2      Glucose 98     NT PROBNP INPATIENT - Normal    N terminal Pro BNP Inpatient 65     TROPONIN T, HIGH SENSITIVITY - Normal    Troponin T, High Sensitivity 7     MAGNESIUM - Normal    Magnesium 2.1     TROPONIN T, HIGH SENSITIVITY - Normal    Troponin T, High Sensitivity 6         Imaging   CT Chest Pulmonary Embolism w Contrast   Final Result   IMPRESSION:      1.  No pulmonary embolism.      2.  Nonaneurysmal without dissection.      3.  Right upper lobe 6-7 mm nodular opacity. This is indeterminate for nodule or scarring. Recommend 6 month follow-up chest CT.      REFERENCE:   Guidelines for Management of Incidental Pulmonary Nodules Detected on CT Images: From the Fleischner Society 2017.    Guidelines apply to incidental nodules in patients who are 35 years or older.   Guidelines do not apply to lung cancer screening, patients with immunosuppression, or patients with known primary cancer.      SINGLE NODULE   Nodule size less than or equal to 6 mm   Low-risk patients: No follow-up needed.   High-risk patients: Optional follow-up at 12 months.      Nodule size 6-8 mm   Low-risk patients: Follow-up CT at 6-12 months, then consider CT at 18-24 months.   High-risk patients: Follow-up CT at 6-12 months, then at 18-24 months if no change.      Nodule size >8 mm   Either low or high-risk patients:   Consider CT, PET/CT, or tissue sampling at 3 months.         US Lower Extremity Venous  Duplex Left   Final Result   IMPRESSION:   1.  No deep venous thrombosis in the left lower extremity.   2.  Popliteal fossa cyst        ECG results from 10/18/24   EKG 12-lead, tracing only     Value    Systolic Blood Pressure     Diastolic Blood Pressure     Ventricular Rate 82    Atrial Rate 82    IN Interval 146    QRS Duration 72        QTc 387    P Axis 56    R AXIS 19    T Axis 71    Interpretation ECG      Sinus rhythm  Minimal voltage criteria for LVH, may be normal variant ( R in aVL )  Nonspecific ST and T wave abnormality  Abnormal ECG  No previous ECGs available  Confirmed by GENERATED REPORT, COMPUTER ([id number]),  Milena Ramos ([id number]) on 10/18/2024 7:21:03 PM           Independent Interpretation   None    ED Course      Medications Administered   Medications   iopamidol (ISOVUE-370) solution 70 mL (70 mLs Intravenous $Given 10/18/24 2006)   Saline (94 mLs Intravenous $Given 10/18/24 2005)       Procedures   Procedures     Discussion of Management   None    ED Course        Additional Documentation  None    Medical Decision Making / Diagnosis     CMS Diagnoses: None    MIPS      CT for PE was ordered because the patient had an abnormal d-dimer.    YOHAN Liriano is a 74 year old female who presents with 1 month of shortness of breath symptoms.  Patient had an elevated D-dimer at clinic earlier today and was sent in for potential evaluation of pulmonary embolism.  CT scan obtained given elevated D-dimer and thankfully no evidence of pulmonary embolism.  Also no evidence of aneurysm and no evidence of dissection.  There was a incidental right upper lobe nodular opacity which could be concerning for nodule versus scarring.  She was educated on this finding and encouraged to follow-up closely with the primary care provider to discuss future imaging follow-up.  Thankfully BNP within normal range.  BNP also within normal range and low suspicion for heart failure.  Troponin studies have been  nondynamic and low suspicion for ACS.  EKG shows no concerning ischemic changes nor arrhythmias.  Patient had an ultrasound of the left lower extremity given chronic swelling and there is no evidence of a DVT.  There was a small popliteal fossa cyst noted.  Patient's electrolytes appear normal.  She is not acutely dyspneic here in the emergency department and has no evidence of hypoxia.  She does need to establish with a primary care provider and states that she has an appointment for next Friday with an outside clinic.  I placed a referral for primary care in case she is unable to make that appointment next Friday or if any additional questions or concerns arise.  Unclear to unifying etiology for the patient's shortness of breath symptoms with thankfully no sinister pathologies necessitating further emergent workup or hospitalization at this time.  After all questions answered and return precautions understood, discharged home.    Due to language barrier, an  was present during the history-taking and subsequent discussion (and for part of the physical exam) with this patient.    Disposition   The patient was discharged.     Diagnosis     ICD-10-CM    1. Shortness of breath  R06.02 Primary Care Referral      2. Synovial cyst of left popliteal space  M71.22       3. Right upper lobe pulmonary nodule  R91.1            Discharge Medications   New Prescriptions    No medications on file         MD Jodi Mcconnell Scott, MD  10/18/24 2284

## 2024-10-18 NOTE — RESULT ENCOUNTER NOTE
Team - please call patient with results.  I called her daughter at 2:00pm and LVM to have her call the clinic   Her d dimer is elevated and she needs to go to the ED for further evaluation/CT scan  Please try her again and relay above message  Thank you

## 2024-10-25 ENCOUNTER — OFFICE VISIT (OUTPATIENT)
Dept: FAMILY MEDICINE | Facility: CLINIC | Age: 74
End: 2024-10-25
Payer: COMMERCIAL

## 2024-10-25 VITALS
DIASTOLIC BLOOD PRESSURE: 69 MMHG | SYSTOLIC BLOOD PRESSURE: 122 MMHG | BODY MASS INDEX: 32.88 KG/M2 | HEART RATE: 79 BPM | WEIGHT: 192.6 LBS | TEMPERATURE: 97.8 F | OXYGEN SATURATION: 100 % | RESPIRATION RATE: 16 BRPM | HEIGHT: 64 IN

## 2024-10-25 DIAGNOSIS — R91.8 PULMONARY NODULES: ICD-10-CM

## 2024-10-25 DIAGNOSIS — R06.02 SHORTNESS OF BREATH: Primary | ICD-10-CM

## 2024-10-25 PROCEDURE — 99213 OFFICE O/P EST LOW 20 MIN: CPT | Performed by: PHYSICIAN ASSISTANT

## 2024-10-25 RX ORDER — ALBUTEROL SULFATE 90 UG/1
2 INHALANT RESPIRATORY (INHALATION) EVERY 6 HOURS PRN
Qty: 18 G | Refills: 1 | Status: SHIPPED | OUTPATIENT
Start: 2024-10-25

## 2024-10-25 RX ORDER — INHALER, ASSIST DEVICES
SPACER (EA) MISCELLANEOUS
Qty: 1 EACH | Refills: 1 | Status: SHIPPED | OUTPATIENT
Start: 2024-10-25

## 2024-10-25 ASSESSMENT — PAIN SCALES - GENERAL: PAINLEVEL_OUTOF10: NO PAIN (0)

## 2024-10-25 NOTE — PROGRESS NOTES
Assessment and Plan:     (R06.02) Shortness of breath  (primary encounter diagnosis)  Comment: here for follow-up, I saw her last week and d dimer was positive, she was evaluated in the ED wit CT chest, labs including troponins, bnp and EKG, no acute findings, did have pulm nodule, she took three days of doubled lasix which helped a bit, she did have recent URI about a month ago, suspect could have component of RAD, also needs echo which was ordered at last visit, discussed again today  Plan: albuterol (PROAIR HFA/PROVENTIL HFA/VENTOLIN         HFA) 108 (90 Base) MCG/ACT inhaler, spacer         (OPTICHAMBER YON) holding chamber        Schedule echo, will trial albuterol in case component of RAD, continue normal dose of lasix  Discussed reasons to be seen promptly   She declined flu and covid vaccines today, she will get a pharmacy in next couple weeks    (R91.8) Pulmonary nodules  Comment: rec repeat chest CT in 6 months   Plan: CT Chest w/o Contrast    [facility]     [place]   Marixa is a 74 year old, presenting for the following health issues:  No chief complaint on file.    HPI     Here for ED follow-up  I saw her in clinic last week for sob and dimer was elevated, she was referred to the ED for further evaluation, the following is the MDM from that visit on 10/18/24:    Marixa Liriano is a 74 year old female who presents with 1 month of shortness of breath symptoms.  Patient had an elevated D-dimer at clinic earlier today and was sent in for potential evaluation of pulmonary embolism.  CT scan obtained given elevated D-dimer and thankfully no evidence of pulmonary embolism.  Also no evidence of aneurysm and no evidence of dissection.  There was a incidental right upper lobe nodular opacity which could be concerning for nodule versus scarring.  She was educated on this finding and encouraged to follow-up closely with the primary care provider to discuss future imaging  "follow-up.  Thankfully BNP within normal range.  BNP also within normal range and low suspicion for heart failure.  Troponin studies have been nondynamic and low suspicion for ACS.  EKG shows no concerning ischemic changes nor arrhythmias.  Patient had an ultrasound of the left lower extremity given chronic swelling and there is no evidence of a DVT.  There was a small popliteal fossa cyst noted.  Patient's electrolytes appear normal.  She is not acutely dyspneic here in the emergency department and has no evidence of hypoxia.  She does need to establish with a primary care provider and states that she has an appointment for next Friday with an outside clinic.  I placed a referral for primary care in case she is unable to make that appointment next Friday or if any additional questions or concerns arise.  Unclear to unifying etiology for the patient's shortness of breath symptoms with thankfully no sinister pathologies necessitating further emergent workup or hospitalization at this time.  After all questions answered and return precautions understood, discharged home.       After her visit with me, she increased lasix from 20mg to 40mg x 3 days and noticed mild improvement in breathing   She has never used an inhaler before  She hasn't scheduled her echo yet          Objective    LMP  (LMP Unknown)   There is no height or weight on file to calculate BMI.    /69 (BP Location: Right arm, Patient Position: Sitting, Cuff Size: Adult Large)   Pulse 79   Temp 97.8  F (36.6  C) (Oral)   Resp 16   Ht 1.626 m (5' 4\")   Wt 87.4 kg (192 lb 9.6 oz)   LMP  (LMP Unknown)   SpO2 100%   BMI 33.06 kg/m      Physical Exam     GENERAL: healthy, alert and no distress  ENT: mmm, op clear   RESP: lungs clear to auscultation - no rales, no rhonchi, no wheezes  CV: regular rates and rhythm, faint systolic mumur  MS: extremities- no gross deformities noted, trace edema           Signed Electronically by: Sigrid Lord " BEVERLY Albrecht

## 2024-10-25 NOTE — Clinical Note
Please call patient (I forgot to mention at visit) with  and let her know she had incidental finding of pulm nodule on chest CT and needs follow-up in 6 months, I've placed an order in her chart she should hear from scheduling in a couple days, please give her # to call if she does not hear within that time frame.thanks

## 2024-10-25 NOTE — PATIENT INSTRUCTIONS
Please call to schedule your echocardiogram: [phone]    Continue current medications    Your can try albuterol as needed for shortness of breath, use with a spacer chamber    Please schedule establish care visit with a primary care provder

## 2024-10-29 ENCOUNTER — TELEPHONE (OUTPATIENT)
Dept: FAMILY MEDICINE | Facility: CLINIC | Age: 74
End: 2024-10-29
Payer: COMMERCIAL

## 2024-10-29 DIAGNOSIS — R91.8 PULMONARY NODULES: Primary | ICD-10-CM

## 2024-10-29 NOTE — TELEPHONE ENCOUNTER
Patient Contact    Attempt # 1    Was call answered?  No.  Left message on voicemail with information to call me back.    Upon callback, please review the following provider note with the pt and assist with scheduling as requested:      Maureen Velez RN

## 2024-10-31 NOTE — TELEPHONE ENCOUNTER
Patient Contact     Attempt # 2     Was call answered?  No.  Left message on voicemail with information to call triage back.     On callback please inform pt on provider's recommendations.     Leonel Segundo RN  [facility]

## 2024-11-01 NOTE — TELEPHONE ENCOUNTER
Phan/[phone]    Attempt #3: Left Message    Writer left message for patient requesting that they return call to discuss message below:

## 2024-11-04 NOTE — TELEPHONE ENCOUNTER
Sigrid, please advise.    Triage have been unable to connect with th pt regarding your followup note.    Maureen Velez RN

## 2024-11-05 NOTE — TELEPHONE ENCOUNTER
Sawyer Mujica,    You see this patient on 11/19 for est care, could you please let her know she needs CT chest in 6 months to follow-up on nodule?  (Order already in chart)    Thanks!

## 2024-11-19 ENCOUNTER — OFFICE VISIT (OUTPATIENT)
Dept: FAMILY MEDICINE | Facility: CLINIC | Age: 74
End: 2024-11-19
Payer: COMMERCIAL

## 2024-11-19 VITALS
WEIGHT: 193 LBS | OXYGEN SATURATION: 100 % | RESPIRATION RATE: 16 BRPM | HEIGHT: 64 IN | SYSTOLIC BLOOD PRESSURE: 123 MMHG | BODY MASS INDEX: 32.95 KG/M2 | DIASTOLIC BLOOD PRESSURE: 73 MMHG | TEMPERATURE: 98.3 F | HEART RATE: 66 BPM

## 2024-11-19 DIAGNOSIS — G89.29 CHRONIC PAIN OF BOTH KNEES: ICD-10-CM

## 2024-11-19 DIAGNOSIS — Z00.00 ENCOUNTER FOR ANNUAL WELLNESS EXAM IN MEDICARE PATIENT: Primary | ICD-10-CM

## 2024-11-19 DIAGNOSIS — M25.561 CHRONIC PAIN OF BOTH KNEES: ICD-10-CM

## 2024-11-19 DIAGNOSIS — R91.8 PULMONARY NODULES: ICD-10-CM

## 2024-11-19 DIAGNOSIS — M25.562 CHRONIC PAIN OF BOTH KNEES: ICD-10-CM

## 2024-11-19 DIAGNOSIS — Z11.59 NEED FOR HEPATITIS C SCREENING TEST: ICD-10-CM

## 2024-11-19 DIAGNOSIS — R60.0 LOWER EXTREMITY EDEMA: ICD-10-CM

## 2024-11-19 DIAGNOSIS — R73.09 ELEVATED GLUCOSE: ICD-10-CM

## 2024-11-19 DIAGNOSIS — E03.9 HYPOTHYROIDISM, UNSPECIFIED TYPE: ICD-10-CM

## 2024-11-19 DIAGNOSIS — M86.9 OSTEITIS OF SYMPHYSIS PUBIS (H): ICD-10-CM

## 2024-11-19 DIAGNOSIS — Z12.31 ENCOUNTER FOR SCREENING MAMMOGRAM FOR BREAST CANCER: ICD-10-CM

## 2024-11-19 DIAGNOSIS — R06.02 SHORTNESS OF BREATH: ICD-10-CM

## 2024-11-19 LAB
ALBUMIN SERPL BCG-MCNC: 4 G/DL (ref 3.5–5.2)
ALP SERPL-CCNC: 113 U/L (ref 40–150)
ALT SERPL W P-5'-P-CCNC: 12 U/L (ref 0–50)
ANION GAP SERPL CALCULATED.3IONS-SCNC: 10 MMOL/L (ref 7–15)
AST SERPL W P-5'-P-CCNC: 29 U/L (ref 0–45)
BILIRUB SERPL-MCNC: 0.5 MG/DL
BUN SERPL-MCNC: 9.2 MG/DL (ref 8–23)
CALCIUM SERPL-MCNC: 9.5 MG/DL (ref 8.8–10.4)
CHLORIDE SERPL-SCNC: 103 MMOL/L (ref 98–107)
CHOLEST SERPL-MCNC: 235 MG/DL
CREAT SERPL-MCNC: 0.6 MG/DL (ref 0.51–0.95)
EGFRCR SERPLBLD CKD-EPI 2021: >90 ML/MIN/1.73M2
EST. AVERAGE GLUCOSE BLD GHB EST-MCNC: 120 MG/DL
FASTING STATUS PATIENT QL REPORTED: YES
FASTING STATUS PATIENT QL REPORTED: YES
GLUCOSE SERPL-MCNC: 86 MG/DL (ref 70–99)
HBA1C MFR BLD: 5.8 % (ref 0–5.6)
HCO3 SERPL-SCNC: 27 MMOL/L (ref 22–29)
HDLC SERPL-MCNC: 61 MG/DL
LDLC SERPL CALC-MCNC: 160 MG/DL
NONHDLC SERPL-MCNC: 174 MG/DL
POTASSIUM SERPL-SCNC: 4.3 MMOL/L (ref 3.4–5.3)
PROT SERPL-MCNC: 7.4 G/DL (ref 6.4–8.3)
SODIUM SERPL-SCNC: 140 MMOL/L (ref 135–145)
TRIGL SERPL-MCNC: 69 MG/DL
TSH SERPL DL<=0.005 MIU/L-ACNC: 3.47 UIU/ML (ref 0.3–4.2)

## 2024-11-19 RX ORDER — SODIUM PHOSPHATE,MONO-DIBASIC 19G-7G/118
2 ENEMA (ML) RECTAL DAILY
Qty: 180 CAPSULE | Refills: 1 | Status: SHIPPED | OUTPATIENT
Start: 2024-11-19

## 2024-11-19 RX ORDER — FUROSEMIDE 20 MG/1
20 TABLET ORAL
Qty: 90 TABLET | Refills: 1 | Status: SHIPPED | OUTPATIENT
Start: 2024-11-19

## 2024-11-19 RX ORDER — LEVOTHYROXINE SODIUM 100 UG/1
100 TABLET ORAL DAILY
Qty: 90 TABLET | Refills: 3 | Status: SHIPPED | OUTPATIENT
Start: 2024-11-19

## 2024-11-19 RX ORDER — CALCIUM CARBONATE/VITAMIN D3 600 MG-10
1 TABLET ORAL
COMMUNITY
Start: 2023-08-16

## 2024-11-19 SDOH — HEALTH STABILITY: PHYSICAL HEALTH: ON AVERAGE, HOW MANY DAYS PER WEEK DO YOU ENGAGE IN MODERATE TO STRENUOUS EXERCISE (LIKE A BRISK WALK)?: 5 DAYS

## 2024-11-19 ASSESSMENT — SOCIAL DETERMINANTS OF HEALTH (SDOH): HOW OFTEN DO YOU GET TOGETHER WITH FRIENDS OR RELATIVES?: MORE THAN THREE TIMES A WEEK

## 2024-11-19 NOTE — PROGRESS NOTES
"Preventive Care Visit  [facility] JUICE Vance PA-C, Physician Assistant - Medical  Nov 19, 2024      Assessment & Plan     Encounter for annual wellness exam in Medicare patient  Annual labs ordered. Healthy diet and exercise reviewed. Recommended routine dental, eye, and skin screenings.  COVID/flu shot recommended.    - TSH WITH FREE T4 REFLEX  - Hepatitis C Screen Reflex to HCV RNA Quant and Genotype  - Lipid panel reflex to direct LDL Non-fasting  - Comprehensive metabolic panel (BMP + Alb, Alk Phos, ALT, AST, Total. Bili, TP)    Pulmonary nodules  CT chest without contrast ordered.  They will call to schedule her in April.  - CT Chest w/o Contrast    Shortness of breath  Lower extremity edema  Helping with breathing as well as lower extremity edema.  Continue.  Upcoming echo.  - furosemide (LASIX) 20 MG tablet  Dispense: 90 tablet; Refill: 1    Hypothyroidism, unspecified type  Check TSH today.  Refilled Synthroid  - TSH WITH FREE T4 REFLEX  - levothyroxine (SYNTHROID/LEVOTHROID) 100 MCG tablet  Dispense: 90 tablet; Refill: 3    Need for hepatitis C screening test  Due for one-time hep C screening  - Hepatitis C Screen Reflex to HCV RNA Quant and Genotype    Elevated glucose  Assess for diabetes  - Hemoglobin A1c    Encounter for screening mammogram for breast cancer  Mammogram ordered  - MA Screen Bilateral w/Odilon    Chronic pain of both knees  Glucosamine refilled  - glucosamine-chondroitin 500-400 MG CAPS per capsule  Dispense: 180 capsule; Refill: 1    Patient has been advised of split billing requirements and indicates understanding: Yes    BMI  Estimated body mass index is 33.13 kg/m  as calculated from the following:    Height as of this encounter: 1.626 m (5' 4\").    Weight as of this encounter: 87.5 kg (193 lb).   Weight management plan: Discussed healthy diet and exercise guidelines    Counseling  Appropriate preventive services were addressed with this patient via " screening, questionnaire, or discussion as appropriate for fall prevention, nutrition, physical activity, Tobacco-use cessation, social engagement, weight loss and cognition.  Checklist reviewing preventive services available has been given to the patient.  Reviewed patient's diet, addressing concerns and/or questions.   Updated plan of care.  Patient reported difficulty with activities of daily living were addressed today.The patient was provided with written information regarding signs of hearing loss.         Marcelino Calvin is a pleasant 74 year old, presenting for the following:  Physical    Presenting today with her daughter and the assistance of a  for an annual wellness visit as well as to establish care.  Moved to [place] from [place].    -History of hypothyroidism.  Requesting Synthroid refill.  Currently on 100 mcg    -Additionally, requesting refill of her Lasix which she has been taking for lower extremity edema.  This has been helpful for her.  Does have an upcoming echocardiogram scheduled.  See recent ER visit/ER follow-up visit.  Additionally, pulmonary nodules noted on CT chest.  Will need repeat CT in April (6 month follow-up).     -Notes bilateral knee pain for which she has been taking glucosamine.  Finds this somewhat helpful.  Requesting refill on this.    -Up-to-date on colon cancer screening.  Due for mammogram.  Last completed in December 2023.    Health Care Directive  Patient does not have a Health Care Directive:       11/19/2024   General Health   How would you rate your overall physical health? (!) FAIR   Feel stress (tense, anxious, or unable to sleep) Not at all            11/19/2024   Nutrition   Diet: I don't know            11/19/2024   Exercise   Days per week of moderate/strenous exercise 5 days            11/19/2024   Social Factors   Frequency of gathering with friends or relatives More than three times a week   Worry food won't last until get money to  buy more No   Food not last or not have enough money for food? No   Do you have housing? (Housing is defined as stable permanent housing and does not include staying ouside in a car, in a tent, in an abandoned building, in an overnight shelter, or couch-surfing.) No   Are you worried about losing your housing? No   Lack of transportation? No   Unable to get utilities (heat,electricity)? No   Want help with housing or utility concern? No      (!) HOUSING CONCERN PRESENT      11/19/2024   Fall Risk   Fallen 2 or more times in the past year? No   Trouble with walking or balance? No        Patient-reported          11/19/2024   Activities of Daily Living- Home Safety   Needs help with the following daily activites Medication administration   Safety concerns in the home None of the above            11/19/2024   Dental   Dentist two times every year? Yes            11/19/2024   Hearing Screening   Hearing concerns? (!) I FEEL THAT PEOPLE ARE MUMBLING OR NOT SPEAKING CLEARLY.    (!) I NEED TO ASK PEOPLE TO SPEAK UP OR REPEAT THEMSELVES.    (!) IT'S HARDER TO UNDERSTAND WOMEN'S VOICES THAN MEN'S VOICES.    (!) TROUBLE UNDERSTANDING SOFT OR WHISPERED SPEECH.       Multiple values from one day are sorted in reverse-chronological order         11/19/2024   Driving Risk Screening   Patient/family members have concerns about driving No            11/19/2024   General Alertness/Fatigue Screening   Have you been more tired than usual lately? No            11/19/2024   Urinary Incontinence Screening   Bothered by leaking urine in past 6 months No            11/19/2024   TB Screening   Were you born outside of the [place]? Yes            Today's PHQ-2 Score:       11/19/2024     9:01 AM   PHQ-2 ( 1999 [employer])   Q1: Little interest or pleasure in doing things 0    Q2: Feeling down, depressed or hopeless 0    PHQ-2 Score 0    Q1: Little interest or pleasure in doing things Not at all   Q2: Feeling down, depressed or hopeless Not at all    PHQ-2 Score 0       Patient-reported           11/19/2024   Substance Use   Alcohol more than 3/day or more than 7/wk No   Do you have a current opioid prescription? No   How severe/bad is pain from 1 to 10? 2/10   Do you use any other substances recreationally? No        Social History     Tobacco Use    Smoking status: Never    Smokeless tobacco: Never    Tobacco comments:     Quit 40 years ago.    Vaping Use    Vaping status: Never Used   Substance Use Topics    Alcohol use: Never    Drug use: Never            ASCVD Risk   The ASCVD Risk score (Jefry MADISON, et al., 2019) failed to calculate for the following reasons:    Cannot find a previous HDL lab    Cannot find a previous total cholesterol lab          Reviewed and updated as needed this visit by Provider    Allergies  Meds   Med Hx  Surg Hx             Past Medical History:   Diagnosis Date    Hypothyroidism     Osteoarthritis      Past Surgical History:   Procedure Laterality Date    HYSTERECTOMY       OB History   No obstetric history on file.     Lab work is in process  Labs reviewed in EPIC  BP Readings from Last 3 Encounters:   11/19/24 123/73   10/25/24 122/69   10/18/24 (!) 146/76    Wt Readings from Last 3 Encounters:   11/19/24 87.5 kg (193 lb)   10/25/24 87.4 kg (192 lb 9.6 oz)   10/18/24 85.6 kg (188 lb 11.2 oz)                  Patient Active Problem List   Diagnosis   (none) - all problems resolved or deleted     Past Surgical History:   Procedure Laterality Date    HYSTERECTOMY         Social History     Tobacco Use    Smoking status: Never    Smokeless tobacco: Never    Tobacco comments:     Quit 40 years ago.    Substance Use Topics    Alcohol use: Never     No family history on file.      Current Outpatient Medications   Medication Sig Dispense Refill    acetaminophen (TYLENOL) 500 MG tablet Take 1,000 mg by mouth      albuterol (PROAIR HFA/PROVENTIL HFA/VENTOLIN HFA) 108 (90 Base) MCG/ACT inhaler Inhale 2 puffs into the lungs  every 6 hours as needed for shortness of breath, wheezing or cough. 18 g 1    calcium carbonate-vitamin D (CALTRATE) 600-10 MG-MCG per tablet Take 1 tablet by mouth.      diclofenac (VOLTAREN) 1 % topical gel APPLY 2-4 GRAMS TOPICALLY IF NEEDED IN THE MORNING NOON EVENING AND BEFORE BEDTIME. APPLY 2G TO JOINTS OF UPPER EXTREMITY AND 4G TO LOWER EX      furosemide (LASIX) 20 MG tablet Take 1 tablet (20 mg) by mouth daily at 2 pm. 90 tablet 1    glucosamine-chondroitin 500-400 MG CAPS per capsule Take 2 capsules by mouth daily. 180 capsule 1    levothyroxine (SYNTHROID/LEVOTHROID) 100 MCG tablet Take 1 tablet (100 mcg) by mouth daily. 90 tablet 3    potassium chloride adolfo ER (KLOR-CON M20) 20 MEQ CR tablet Take 1 tablet (20 mEq) by mouth 2 times daily. 20 tablet 0    spacer (OPTICHAMBER YON) holding chamber Use as directed with inhaler 1 each 1    VITAMIN  B COMPLEX tablet Take 1 tablet by mouth every morning       Allergies   Allergen Reactions    Naproxen Dizziness and Nausea and Vomiting    Other Drug Allergy (See Comments) Muscle Pain (Myalgia)     Recent Labs   Lab Test 10/18/24  1857 10/18/24  1229   CR 0.73 0.66   GFRESTIMATED 85 >90   POTASSIUM 4.1 3.8      Current providers sharing in care for this patient include:  Patient Care Team:  No Ref-Primary, Physician as PCP - General  Clinic - [facility] as Assigned PCP    The following health maintenance items are reviewed in Epic and correct as of today:  Health Maintenance   Topic Date Due    DEXA  Never done    TSH W/FREE T4 REFLEX  Never done    ADVANCE CARE PLANNING  Never done    HEPATITIS C SCREENING  Never done    LIPID  Never done    ZOSTER IMMUNIZATION (2 of 3) 01/07/2014    RSV VACCINE (1 - 1-dose 75+ series) Never done    INFLUENZA VACCINE (1) 09/01/2024    COVID-19 Vaccine (6 - 2024-25 season) 09/01/2024    MEDICARE ANNUAL WELLNESS VISIT  10/13/2024    ANNUAL REVIEW OF HM ORDERS  11/19/2025    FALL RISK ASSESSMENT  11/19/2025     "GLUCOSE  10/18/2027    DTAP/TDAP/TD IMMUNIZATION (3 - Td or Tdap) 04/02/2031    COLORECTAL CANCER SCREENING  10/13/2033    PHQ-2 (once per calendar year)  Completed    Pneumococcal Vaccine: 65+ Years  Completed    HPV IMMUNIZATION  Aged Out    MENINGITIS IMMUNIZATION  Aged Out    RSV MONOCLONAL ANTIBODY  Aged Out    MAMMO SCREENING  Discontinued         Review of Systems  Constitutional, neuro, ENT, endocrine, pulmonary, cardiac, gastrointestinal, genitourinary, musculoskeletal, integument and psychiatric systems are negative, except as otherwise noted.     Objective    Exam  /73 (BP Location: Right arm, Patient Position: Sitting, Cuff Size: Adult Large)   Pulse 66   Temp 98.3  F (36.8  C) (Oral)   Resp 16   Ht 1.626 m (5' 4\")   Wt 87.5 kg (193 lb)   LMP  (LMP Unknown)   SpO2 100%   Breastfeeding No   BMI 33.13 kg/m     Estimated body mass index is 33.13 kg/m  as calculated from the following:    Height as of this encounter: 1.626 m (5' 4\").    Weight as of this encounter: 87.5 kg (193 lb).    Physical Exam  GENERAL: alert and no distress  EYES: Eyes grossly normal to inspection, PERRL and conjunctivae and sclerae normal  HENT: ear canals and TM's normal, nose and mouth without ulcers or lesions  NECK: no adenopathy, no asymmetry, masses, or scars  RESP: lungs clear to auscultation - no rales, rhonchi or wheezes  CV: regular rate and rhythm, normal S1 S2, no S3 or S4, no murmur, click or rub, no peripheral edema  ABDOMEN: soft, nontender, no hepatosplenomegaly, no masses and bowel sounds normal  MS: no gross musculoskeletal defects noted, no edema  SKIN: no suspicious lesions or rashes  NEURO: Normal strength and tone, mentation intact and speech normal  PSYCH: mentation appears normal, affect normal/bright        11/19/2024   Mini Cog   Clock Draw Score 2 Normal   3 Item Recall 2 objects recalled   Mini Cog Total Score 4            Vision Screen         The likelihood of other entities in the " differential is insufficient to justify any further testing for them at this time. This was explained to the patient. The patient was advised that persistent or worsening symptoms would require further evaluation. Patient advised to call the office and if unable to reach to go to the emergency room if they develop any new or worsening symptoms. Expressed understanding and agreement with above stated plan.     Signed Electronically by: Kye Vance PA-C

## 2024-11-19 NOTE — PATIENT INSTRUCTIONS
Call for CT scan of lungs:     [facility] - [facility], [address], [address], [place], [place], [address] [phone]    -Labs today!   -Mammogram  -Upcoming ECHO

## 2024-11-20 ENCOUNTER — PATIENT OUTREACH (OUTPATIENT)
Dept: CARE COORDINATION | Facility: CLINIC | Age: 74
End: 2024-11-20
Payer: COMMERCIAL

## 2024-11-20 LAB — HCV AB SERPL QL IA: NONREACTIVE

## 2024-11-25 ENCOUNTER — HOSPITAL ENCOUNTER (OUTPATIENT)
Dept: CARDIOLOGY | Facility: CLINIC | Age: 74
Discharge: HOME | End: 2024-11-25
Attending: PHYSICIAN ASSISTANT | Admitting: PHYSICIAN ASSISTANT
Payer: COMMERCIAL

## 2024-11-25 DIAGNOSIS — R06.02 SHORTNESS OF BREATH: ICD-10-CM

## 2024-11-25 LAB — LVEF ECHO: NORMAL

## 2024-11-25 PROCEDURE — 93306 TTE W/DOPPLER COMPLETE: CPT | Mod: 26 | Performed by: INTERNAL MEDICINE

## 2024-11-25 PROCEDURE — 93306 TTE W/DOPPLER COMPLETE: CPT

## 2024-11-26 ENCOUNTER — APPOINTMENT (OUTPATIENT)
Dept: INTERPRETER SERVICES | Facility: CLINIC | Age: 74
End: 2024-11-26
Payer: COMMERCIAL

## 2024-11-26 ENCOUNTER — TELEPHONE (OUTPATIENT)
Dept: FAMILY MEDICINE | Facility: CLINIC | Age: 74
End: 2024-11-26
Payer: COMMERCIAL

## 2024-11-26 NOTE — TELEPHONE ENCOUNTER
----- Message from Sigrid Albrecht sent at 11/26/2024  8:45 AM CST -----  Please let patient know echo is fine. If sob persists despite lasix and after recovered from recent URI, recommend OV with pcp    Thanks

## 2024-11-26 NOTE — RESULT ENCOUNTER NOTE
Please let patient know echo is fine. If sob persists despite lasix and after recovered from recent URI, recommend OV with pcp    Thanks

## 2024-11-27 ENCOUNTER — APPOINTMENT (OUTPATIENT)
Dept: INTERPRETER SERVICES | Facility: CLINIC | Age: 74
End: 2024-11-27
Payer: COMMERCIAL

## 2024-11-27 NOTE — TELEPHONE ENCOUNTER
Triage outreach    Attempt number 1    Left message to call back at [phone].    Mili RN  [facility]

## 2024-11-29 NOTE — TELEPHONE ENCOUNTER
Triage Patient Outreach    Attempt # 2    Was call answered?  No.  Unable to leave message, recall needed. Phone keeps ringing, no option for VM.    Camille Ramsey RN

## 2024-12-02 NOTE — TELEPHONE ENCOUNTER
Patient Contact     Attempt # 3     Was call answered?  No.  Left message on voicemail with information to call triage back.     On callback please inform pt on the provider's result note.   3rd attempt. Closing encounter.     Leonel Segundo RN  [facility]

## 2025-02-06 ENCOUNTER — OFFICE VISIT (OUTPATIENT)
Dept: FAMILY MEDICINE | Facility: CLINIC | Age: 75
End: 2025-02-06
Payer: MEDICARE

## 2025-02-06 VITALS
BODY MASS INDEX: 32.95 KG/M2 | HEART RATE: 89 BPM | OXYGEN SATURATION: 98 % | SYSTOLIC BLOOD PRESSURE: 133 MMHG | DIASTOLIC BLOOD PRESSURE: 85 MMHG | RESPIRATION RATE: 16 BRPM | WEIGHT: 193 LBS | TEMPERATURE: 97.8 F | HEIGHT: 64 IN

## 2025-02-06 DIAGNOSIS — M25.561 PAIN IN BOTH KNEES, UNSPECIFIED CHRONICITY: ICD-10-CM

## 2025-02-06 DIAGNOSIS — R06.02 SHORTNESS OF BREATH: ICD-10-CM

## 2025-02-06 DIAGNOSIS — M25.511 BILATERAL SHOULDER PAIN, UNSPECIFIED CHRONICITY: Primary | ICD-10-CM

## 2025-02-06 DIAGNOSIS — M25.512 BILATERAL SHOULDER PAIN, UNSPECIFIED CHRONICITY: Primary | ICD-10-CM

## 2025-02-06 DIAGNOSIS — M25.562 PAIN IN BOTH KNEES, UNSPECIFIED CHRONICITY: ICD-10-CM

## 2025-02-06 RX ORDER — ALBUTEROL SULFATE 90 UG/1
2 INHALANT RESPIRATORY (INHALATION) EVERY 6 HOURS PRN
Qty: 18 G | Refills: 1 | Status: SHIPPED | OUTPATIENT
Start: 2025-02-06

## 2025-02-06 NOTE — PROGRESS NOTES
The patient presents with her daughter who serves as an .  She is here for pains in her shoulders and knees.  It sounds like they have been going on for years.  She has some back pain again for years.  It is a bit higher to get a good history but it sounds like the shoulders are mostly with movements.  No history of vasculitis.  No headaches, fevers.  The knees have been hurting for quite some time it sounds like and she has seen Ortho before and had injections.  Is not clear if this is helpful.  She feels fine otherwise.  She does take Advil and has no contraindications.  She has not tried physical therapy for this.    Additionally, because of her pain she is not able to access public transportation.  Additionally, she does not speak English so it would be impossible as well.  She wants [employer] mobility form done today.    Past Medical History:   Diagnosis Date    Hypothyroidism     Osteoarthritis      Past Surgical History:   Procedure Laterality Date    HYSTERECTOMY       Social History     Socioeconomic History    Marital status: Single     Spouse name: Not on file    Number of children: Not on file    Years of education: Not on file    Highest education level: Not on file   Occupational History    Not on file   Tobacco Use    Smoking status: Never    Smokeless tobacco: Never    Tobacco comments:     Quit 40 years ago.    Vaping Use    Vaping status: Never Used   Substance and Sexual Activity    Alcohol use: Never    Drug use: Never    Sexual activity: Not Currently     Partners: Male   Other Topics Concern    Not on file   Social History Narrative    Not on file     Social Drivers of Health     Financial Resource Strain: Low Risk  (11/19/2024)    Financial Resource Strain     Within the past 12 months, have you or your family members you live with been unable to get utilities (heat, electricity) when it was really needed?: No   Food Insecurity: Low Risk  (11/19/2024)    Food Insecurity     Within the  past 12 months, did you worry that your food would run out before you got money to buy more?: No     Within the past 12 months, did the food you bought just not last and you didn t have money to get more?: No   Transportation Needs: Low Risk  (11/19/2024)    Transportation Needs     Within the past 12 months, has lack of transportation kept you from medical appointments, getting your medicines, non-medical meetings or appointments, work, or from getting things that you need?: No   Physical Activity: Unknown (11/19/2024)    Exercise Vital Sign     Days of Exercise per Week: 5 days     Minutes of Exercise per Session: Not on file   Stress: No Stress Concern Present (11/19/2024)    [employer] [place] [employer] - Occupational Stress Questionnaire     Feeling of Stress : Not at all   Social Connections: Unknown (11/19/2024)    Social Connection and Isolation Panel [NHANES]     Frequency of Communication with Friends and Family: Not on file     Frequency of Social Gatherings with Friends and Family: More than three times a week     Attends Yazdanism Services: Not on file     Active Member of Clubs or Organizations: Not on file     Attends Club or Organization Meetings: Not on file     Marital Status: Not on file   Interpersonal Safety: Low Risk  (11/19/2024)    Interpersonal Safety     Do you feel physically and emotionally safe where you currently live?: Yes     Within the past 12 months, have you been hit, slapped, kicked or otherwise physically hurt by someone?: No     Within the past 12 months, have you been humiliated or emotionally abused in other ways by your partner or ex-partner?: No   Housing Stability: High Risk (11/19/2024)    Housing Stability     Do you have housing? : No     Are you worried about losing your housing?: No     Current Outpatient Medications   Medication Sig Dispense Refill    acetaminophen (TYLENOL) 500 MG tablet Take 1,000 mg by mouth      albuterol (PROAIR HFA/PROVENTIL  "HFA/VENTOLIN HFA) 108 (90 Base) MCG/ACT inhaler Inhale 2 puffs into the lungs every 6 hours as needed for shortness of breath, wheezing or cough. 18 g 1    atorvastatin (LIPITOR) 20 MG tablet Take 1 tablet (20 mg) by mouth daily. 90 tablet 3    calcium carbonate-vitamin D (CALTRATE) 600-10 MG-MCG per tablet Take 1 tablet by mouth.      diclofenac (VOLTAREN) 1 % topical gel APPLY 2-4 GRAMS TOPICALLY IF NEEDED IN THE MORNING NOON EVENING AND BEFORE BEDTIME. APPLY 2G TO JOINTS OF UPPER EXTREMITY AND 4G TO LOWER EX      furosemide (LASIX) 20 MG tablet Take 1 tablet (20 mg) by mouth daily at 2 pm. 90 tablet 1    glucosamine-chondroitin 500-400 MG CAPS per capsule Take 2 capsules by mouth daily. 180 capsule 1    levothyroxine (SYNTHROID/LEVOTHROID) 100 MCG tablet Take 1 tablet (100 mcg) by mouth daily. 90 tablet 3    potassium chloride adolfo ER (KLOR-CON M20) 20 MEQ CR tablet Take 1 tablet (20 mEq) by mouth 2 times daily. 20 tablet 0    spacer (OPTICHAMBER YON) holding chamber Use as directed with inhaler 1 each 1    VITAMIN  B COMPLEX tablet Take 1 tablet by mouth every morning       Allergies   Allergen Reactions    Naproxen Dizziness and Nausea and Vomiting    Other Drug Allergy (See Comments) Muscle Pain (Myalgia)     FAMILY HISTORY NOTED AND REVIEWED    REVIEW OF SYSTEMS: above    PHYSICAL EXAM    /85 (BP Location: Left arm, Patient Position: Sitting, Cuff Size: Adult Large)   Pulse 89   Temp 97.8  F (36.6  C)   Resp 16   Ht 1.626 m (5' 4\")   Wt 87.5 kg (193 lb)   LMP  (LMP Unknown)   SpO2 98%   BMI 33.13 kg/m      Patient appears non toxic  Examination of her shoulders bilaterally reveal some discomfort with range of motion but they are not tender, warm or red.  Her knees are not tender or warm or red.  Fairly good range of motion.  There is no temporal artery tenderness  Examination of her wrists and hands is unremarkable.    ASSESSMENT:  Shoulder pain, most consistent with DJD, doubt polymyalgia " rheumatica or other cause  Knee pain, again likely DJD, doubt infectious or other cause, no signs inflammatory arthritis  Need for form to be filled out    PLAN:  Referral to physical therapy  She can use Advil as needed, no contraindications  [employer] mobility form done today    Molina Webb M.D.    The longitudinal plan of care for the diagnosis(es)/condition(s) as documented were addressed during this visit. Due to the added complexity in care, I will continue to support Marixa in the subsequent management and with ongoing continuity of care.

## 2025-02-06 NOTE — PATIENT INSTRUCTIONS
Do the physical therapy and if your symptoms are not better soon let me know.    Molina Webb M.D.     Status: S/P T7-11 percutaneous Posterior instrumented fusion for unstable T8-9 fracture on 9/9. Off insulin drip as of 9/11. Hx of CkD, prostate Ca, and DMII.   Vitals: VSS  Neuros: A&Ox4. Forgetful. Numbness to feet baseline.   IV: PIV SL  Labs/Electrolytes: BG checks ACHS  Resp/trach: RA  Diet: regular diet w/ carb coverage. Good dinner intake  Bowel status: BM this shift  : voids spont to BR or urinal  Skin: back incision covered w [employer] CDI  Pain: back/rib pain managed w/ scheduled meds, PRN tylenol, and lidocaine patches  Activity: A1 GB/walker, TLSO when OOB  Plan&updates: discharge to ARU when bed available    Goal Outcome Evaluation:      Plan of Care Reviewed With: patient    Overall Patient Progress: improvingOverall Patient Progress: improving    Outcome Evaluation: medically ready for d/c, pending placement

## 2025-02-13 ENCOUNTER — ANCILLARY PROCEDURE (OUTPATIENT)
Dept: MAMMOGRAPHY | Facility: CLINIC | Age: 75
End: 2025-02-13
Attending: PHYSICIAN ASSISTANT
Payer: MEDICARE

## 2025-02-13 DIAGNOSIS — Z12.31 ENCOUNTER FOR SCREENING MAMMOGRAM FOR BREAST CANCER: ICD-10-CM

## 2025-02-13 PROCEDURE — 77067 SCR MAMMO BI INCL CAD: CPT | Mod: TC | Performed by: RADIOLOGY

## 2025-02-13 PROCEDURE — T1013 SIGN LANG/ORAL INTERPRETER: HCPCS | Performed by: OBSTETRICS & GYNECOLOGY

## 2025-02-13 PROCEDURE — 77063 BREAST TOMOSYNTHESIS BI: CPT | Mod: TC | Performed by: RADIOLOGY

## 2025-03-28 ENCOUNTER — ANCILLARY PROCEDURE (OUTPATIENT)
Dept: GENERAL RADIOLOGY | Facility: CLINIC | Age: 75
End: 2025-03-28
Attending: PHYSICIAN ASSISTANT
Payer: MEDICARE

## 2025-03-28 PROCEDURE — 71046 X-RAY EXAM CHEST 2 VIEWS: CPT | Mod: TC | Performed by: RADIOLOGY

## 2025-04-11 ENCOUNTER — APPOINTMENT (OUTPATIENT)
Dept: INTERPRETER SERVICES | Facility: CLINIC | Age: 75
End: 2025-04-11
Payer: MEDICARE

## 2025-05-08 ENCOUNTER — OFFICE VISIT (OUTPATIENT)
Dept: FAMILY MEDICINE | Facility: CLINIC | Age: 75
End: 2025-05-08
Payer: MEDICARE

## 2025-05-08 VITALS
DIASTOLIC BLOOD PRESSURE: 69 MMHG | BODY MASS INDEX: 33.12 KG/M2 | WEIGHT: 194 LBS | OXYGEN SATURATION: 98 % | HEIGHT: 64 IN | SYSTOLIC BLOOD PRESSURE: 134 MMHG | HEART RATE: 65 BPM | RESPIRATION RATE: 16 BRPM

## 2025-05-08 DIAGNOSIS — R60.9 EDEMA, UNSPECIFIED TYPE: ICD-10-CM

## 2025-05-08 DIAGNOSIS — M72.2 PLANTAR FASCIITIS: Primary | ICD-10-CM

## 2025-05-08 DIAGNOSIS — M25.511 RIGHT SHOULDER PAIN, UNSPECIFIED CHRONICITY: ICD-10-CM

## 2025-05-08 PROBLEM — R91.1 INCIDENTAL LUNG NODULE, > 3MM AND < 8MM: Status: ACTIVE | Noted: 2024-10-01

## 2025-05-08 RX ORDER — POTASSIUM CHLORIDE 1500 MG/1
20 TABLET, EXTENDED RELEASE ORAL DAILY
Qty: 90 TABLET | Refills: 0 | Status: SHIPPED | OUTPATIENT
Start: 2025-05-08

## 2025-05-08 NOTE — PATIENT INSTRUCTIONS
I want you to go to physical therapy for the shoulder.    Follow the printed directions for the foot pain    Molina Webb M.D.

## 2025-05-08 NOTE — PROGRESS NOTES
The patient presents with her daughter who helps with interpretation for a few issues.    She has had right shoulder pain for about 2 months.  There was no trauma or injury.  Not on the left side.  No pain at rest.  Some pain in the shoulder and it radiates down a bit but no weakness.  No fevers and she has not been ill.    She for the last week she has had pain in the bottom of the right foot.  Its mostly when she steps on it.  There is no trauma or injury.    The patient has chronic edema.  She uses furosemide.  She needs Lasix refilled.    Past Medical History:   Diagnosis Date    Hyperlipidemia LDL goal <100     Hypothyroidism     Incidental lung nodule, > 3mm and < 8mm 10/2024    on ct for sob, to fu 6 to 12 months    Osteoarthritis     SOB (shortness of breath)     echo 11/2024 nl; also ct chest no pe and nl done 10/2024     Past Surgical History:   Procedure Laterality Date    HYSTERECTOMY       Social History     Socioeconomic History    Marital status: Single     Spouse name: Not on file    Number of children: Not on file    Years of education: Not on file    Highest education level: Not on file   Occupational History    Not on file   Tobacco Use    Smoking status: Never    Smokeless tobacco: Never    Tobacco comments:     Quit 40 years ago.    Vaping Use    Vaping status: Never Used   Substance and Sexual Activity    Alcohol use: Never    Drug use: Never    Sexual activity: Not Currently     Partners: Male   Other Topics Concern    Not on file   Social History Narrative    Not on file     Social Drivers of Health     Financial Resource Strain: Low Risk  (11/19/2024)    Financial Resource Strain     Within the past 12 months, have you or your family members you live with been unable to get utilities (heat, electricity) when it was really needed?: No   Food Insecurity: Low Risk  (11/19/2024)    Food Insecurity     Within the past 12 months, did you worry that your food would run out before you got money to  buy more?: No     Within the past 12 months, did the food you bought just not last and you didn t have money to get more?: No   Transportation Needs: Low Risk  (11/19/2024)    Transportation Needs     Within the past 12 months, has lack of transportation kept you from medical appointments, getting your medicines, non-medical meetings or appointments, work, or from getting things that you need?: No   Physical Activity: Unknown (11/19/2024)    Exercise Vital Sign     Days of Exercise per Week: 5 days     Minutes of Exercise per Session: Not on file   Stress: No Stress Concern Present (11/19/2024)    [employer] - Occupational Stress Questionnaire     Feeling of Stress : Not at all   Social Connections: Unknown (11/19/2024)    Social Connection and Isolation Panel [NHANES]     Frequency of Communication with Friends and Family: Not on file     Frequency of Social Gatherings with Friends and Family: More than three times a week     Attends [employer] Services: Not on file     Active Member of Clubs or Organizations: Not on file     Attends Club or Organization Meetings: Not on file     Marital Status: Not on file   Interpersonal Safety: Low Risk  (11/19/2024)    Interpersonal Safety     Do you feel physically and emotionally safe where you currently live?: Yes     Within the past 12 months, have you been hit, slapped, kicked or otherwise physically hurt by someone?: No     Within the past 12 months, have you been humiliated or emotionally abused in other ways by your partner or ex-partner?: No   Housing Stability: High Risk (11/19/2024)    Housing Stability     Do you have housing? : No     Are you worried about losing your housing?: No     Current Outpatient Medications   Medication Sig Dispense Refill    acetaminophen (TYLENOL) 500 MG tablet Take 1,000 mg by mouth      albuterol (PROAIR HFA/PROVENTIL HFA/VENTOLIN HFA) 108 (90 Base) MCG/ACT inhaler Inhale 2 puffs into the lungs every 6 hours  "as needed for shortness of breath, wheezing or cough. 18 g 1    atorvastatin (LIPITOR) 20 MG tablet Take 1 tablet (20 mg) by mouth daily. 90 tablet 3    calcium carbonate-vitamin D (CALTRATE) 600-10 MG-MCG per tablet Take 1 tablet by mouth.      diclofenac (VOLTAREN) 1 % topical gel APPLY 2-4 GRAMS TOPICALLY IF NEEDED IN THE MORNING NOON EVENING AND BEFORE BEDTIME. APPLY 2G TO JOINTS OF UPPER EXTREMITY AND 4G TO LOWER EX      furosemide (LASIX) 20 MG tablet Take 1 tablet (20 mg) by mouth daily at 2 pm. 90 tablet 1    glucosamine-chondroitin 500-400 MG CAPS per capsule Take 2 capsules by mouth daily. 180 capsule 1    levothyroxine (SYNTHROID/LEVOTHROID) 100 MCG tablet Take 1 tablet (100 mcg) by mouth daily. 90 tablet 3    potassium chloride adolfo ER (KLOR-CON M20) 20 MEQ CR tablet Take 1 tablet (20 mEq) by mouth daily. 90 tablet 0    spacer (OPTICHAMBER OYN) holding chamber Use as directed with inhaler 1 each 1    VITAMIN  B COMPLEX tablet Take 1 tablet by mouth every morning       Allergies   Allergen Reactions    Naproxen Dizziness and Nausea and Vomiting    Other Drug Allergy (See Comments) Muscle Pain (Myalgia)     FAMILY HISTORY NOTED AND REVIEWED    REVIEW OF SYSTEMS: above    PHYSICAL EXAM    /69 (BP Location: Left arm, Patient Position: Sitting, Cuff Size: Adult Large)   Pulse 65   Resp 16   Ht 1.626 m (5' 4\")   Wt 88 kg (194 lb)   LMP  (LMP Unknown)   SpO2 98%   BMI 33.30 kg/m      Patient appears non toxic  Right shoulder does have some discomfort with range of motion.  Is not red, swollen or tender.  CMS is intact in the upper extremity.  Examination of the right foot reveals no swelling, redness or warmth.  Some reproducible tenderness in the heel over the plantar fascial area    ASSESSMENT:  Shoulder pain, may be rotator cuff, will try physical therapy and if not better let us know  Plantar fasciitis, to try stretches, can do a heel insert, instructions given to her written out on the " AVS  Edema    PLAN:  Above  To call if not resolving soon    Molina Webb M.D.    The longitudinal plan of care for the diagnosis(es)/condition(s) as documented were addressed during this visit. Due to the added complexity in care, I will continue to support Marixa in the subsequent management and with ongoing continuity of care.

## 2025-06-19 ENCOUNTER — THERAPY VISIT (OUTPATIENT)
Dept: PHYSICAL THERAPY | Facility: CLINIC | Age: 75
End: 2025-06-19
Attending: INTERNAL MEDICINE
Payer: MEDICARE

## 2025-06-19 DIAGNOSIS — M79.671 RIGHT FOOT PAIN: ICD-10-CM

## 2025-06-19 DIAGNOSIS — M79.662 PAIN OF LEFT LOWER LEG: ICD-10-CM

## 2025-06-19 DIAGNOSIS — M25.511 RIGHT SHOULDER PAIN, UNSPECIFIED CHRONICITY: Primary | ICD-10-CM

## 2025-06-19 ASSESSMENT — ACTIVITIES OF DAILY LIVING (ADL)
PLEASE_INDICATE_YOR_PRIMARY_REASON_FOR_REFERRAL_TO_THERAPY:: SHOULDER
REMOVING_SOMETHING_FROM_YOUR_BACK_POCKET: 5
PLACING_AN_OBJECT_ON_A_HIGH_SHELF: 7
WHEN_LYING_ON_THE_INVOLVED_SIDE: 7
PUTTING_ON_A_SHIRT_THAT_BUTTONS_DOWN_THE_FRONT: 3
WASHING_YOUR_BACK: 7
REACHING_FOR_SOMETHING_ON_A_HIGH_SHELF: 7
PUTTING_ON_YOUR_PANTS: 3
CARRYING_A_HEAVY_OBJECT_OF_10_POUNDS: 8
TOUCHING_THE_BACK_OF_YOUR_NECK: 7
AT_ITS_WORST?: 7
WASHING_YOUR_HAIR?: 7
PUSHING_WITH_THE_INVOLVED_ARM: 7
PUTTING_ON_AN_UNDERSHIRT_OR_A_PULLOVER_SWEATER: 7

## 2025-06-19 NOTE — PROGRESS NOTES
PHYSICAL THERAPY EVALUATION  Type of Visit: Evaluation       Fall Risk Screen:  Have you fallen 2 or more times in the past year?: No  Have you fallen and had an injury in the past year?: No    Subjective     Pt is a 74 year old female presenting to PT with chief complaint of right shoulder pain. She notes her pain has been ongoing for 1 year. She denies DWAIN. She localizes her pain to the superior aspect of her right shoulder and her entire R UE. She notes her pain wakes her up at night occasionally if she lays on her right shoulder. She denies NT into her hand related to her shoulder pain. She has increased pain with reaching overhead and away form her body, she also has increased pain right when she wakes up. Pt manages her pain with a topical pain cream and self massage.         Presenting condition or subjective complaint: right shoulder pain  Date of onset: 05/08/25 (refreral)    Relevant medical history:     Past Medical History:   Diagnosis Date    Hyperlipidemia LDL goal <100     Hypothyroidism     Incidental lung nodule, > 3mm and < 8mm 10/2024    on ct for sob, to fu 6 to 12 months    Osteoarthritis     SOB (shortness of breath)     echo 11/2024 nl; also ct chest no pe and nl done 10/2024       Dates & types of surgery:    Past Surgical History:   Procedure Laterality Date    HYSTERECTOMY           Prior diagnostic imaging/testing results:     see chart  Prior therapy history for the same diagnosis, illness or injury: No      Prior Level of Function  Transfers: Independent  Ambulation: Independent  ADL: Independent  IADL: Driving, Finances, Housekeeping    Living Environment  Social support: Alone alone  Type of home: Apartment/condo apartment, 6th floor  Stairs to enter the home: No       Ramp: No   Stairs inside the home: No       Help at home: NoneNA  Equipment owned: Straight Cane NA    Employment: No    Hobbies/Interests:  she enjoys going to the gym    Patient goals for therapy: putting on bra and  reaching overhead and putting jacket on    Pain assessment: Location: superior aspect of right shoulder/Ratin/10     Objective   SHOULDER EVALUATION  OBSERVATION: Pt sits comfortably in eval room.     INTEGUMENTARY (edema, incisions): NA    POSTURE: Sitting Posture: Rounded shoulders, Forward head    ROM:   Right PROM:  WFL  Left PROM:   WFL  Right AROM:  Flexion: 145   Abduction: 160   External rotation: C1  Internal rotation: lateral glute  Left AROM:  Flexion: 160  Abduction: 160  External rotation: C1  Internal rotation:  sacrum  *pain in B shoulder, R>L, with all ROM    STRENGTH:   Flexion: R=3-/5, L=3+/5 *pain  Abduction: 3+/5 B *pain  External rotation: 3+/5 B  Internal rotation: R=3-/5, L=3+/5  Elbow flexion: 4+/5 B  Elbow extension:  4+/5 B    NEUROLOGIC ASSESSMENT:  Myotomes: WNL  Dermatomes: WNL  Reflexes: WNL    SPECIAL TESTS:   Olga mariel: + B  Neers:  -  Obriens:  -  Empty can: -  Upper cut: -  External rotation strength: -  Sulcus sign: -  Upper limb tension tests: NT  Spurlings: NT    PALPATION: pain in LHB tendon, supra- and infra- spinatus tendons, pain in deltoid, triceps, and bicep muscle belly    JOINT MOBILITY:   GH joint: WFL  AC joint: WFL  Scapulothoracic: WFL  Thoracic spine: lacking extension      Assessment & Plan   CLINICAL IMPRESSIONS  Medical Diagnosis: Right shoulder pain, unspecified chronicity    Treatment Diagnosis: right shoulder pain   Impression/Assessment:     Pt is a 74 year old female presenting to PT with chief complaint of right shoulder pain. The patient presents with decreased ROM, decreased strength, and increased pain which inhibits their ability to perform hobbies and ADLs. The patient tolerated the session well.  HEP was initaited, pt educated to perform within her pain limits. The patient will continue to benefit from skilled PT for improved strength, ROM, and to decrease pain.       Clinical Decision Making (Complexity):  Clinical Presentation:  Stable/Uncomplicated  Clinical Presentation Rationale: based on medical and personal factors listed in PT evaluation  Clinical Decision Making (Complexity): Low complexity    PLAN OF CARE  Treatment Interventions:  Modalities: Cryotherapy, Cupping, Dry Needling, E-stim, Hot Pack, Iontophoresis, Ultrasound  Interventions: Manual Therapy, Neuromuscular Re-education, Therapeutic Activity, Therapeutic Exercise, Self-Care/Home Management    Long Term Goals     PT Goal 1  Goal Identifier: reaching OH  Goal Description: pt will reach OH (160+ degree FE) with 0/10 pains  Rationale: to maximize safety and independence with performance of ADLs and functional tasks  Goal Progress: baseline: pt can actively reach to 145 degrees flexion and 160 degrees ABD  Target Date: 07/19/25  PT Goal 2  Goal Identifier: strength  Goal Description: shoulder IR and ER will be 4/5 B w/ 0/10 pain  Rationale: to maximize safety and independence with performance of ADLs and functional tasks  Goal Progress: baseline: External rotation: 3+/5 B  Internal rotation: R=3-/5, L=3+/5  Target Date: 08/03/25      Frequency of Treatment: 1X per week decreasing to 1X every other week as needed  Duration of Treatment: 2-3 months    Education Assessment:   Learner/Method: Patient;No Barriers to Learning  Education Comments: pt verbalizes understanding of educatoin provided    Risks and benefits of evaluation/treatment have been explained.   Patient/Family/caregiver agrees with Plan of Care.     Evaluation Time:       Signing Clinician: Tania Duffy, PT        [facility]                                                                                   OUTPATIENT PHYSICAL THERAPY      PLAN OF TREATMENT FOR OUTPATIENT REHABILITATION   Patient's Last Name, First Name, Marixa Lindsey YOB: 1950   Provider's Name   [facility]   Medical Record No.  [id number]     Onset Date: 05/08/25  (refreral)  Start of Care Date: 06/19/25     Medical Diagnosis:  Right shoulder pain, unspecified chronicity      PT Treatment Diagnosis:  right shoulder pain Plan of Treatment  Frequency/Duration: 1X per week decreasing to 1X every other week as needed/ 2-3 months    Certification date from 06/19/25 to 09/16/25         See note for plan of treatment details and functional goals     Tania Duffy, PT                         I CERTIFY THE NEED FOR THESE SERVICES FURNISHED UNDER        THIS PLAN OF TREATMENT AND WHILE UNDER MY CARE     (Physician attestation of this document indicates review and certification of the therapy plan).              Referring Provider:  Molina Webb    Initial Assessment  See Epic Evaluation- Start of Care Date: 06/19/25

## 2025-06-26 ENCOUNTER — THERAPY VISIT (OUTPATIENT)
Dept: PHYSICAL THERAPY | Facility: CLINIC | Age: 75
End: 2025-06-26
Attending: INTERNAL MEDICINE
Payer: MEDICARE

## 2025-06-26 DIAGNOSIS — M25.511 RIGHT SHOULDER PAIN, UNSPECIFIED CHRONICITY: Primary | ICD-10-CM

## 2025-07-10 ENCOUNTER — THERAPY VISIT (OUTPATIENT)
Dept: PHYSICAL THERAPY | Facility: CLINIC | Age: 75
End: 2025-07-10
Payer: MEDICARE

## 2025-07-10 DIAGNOSIS — M79.661 PAIN OF RIGHT LOWER LEG: ICD-10-CM

## 2025-07-10 DIAGNOSIS — M79.671 RIGHT FOOT PAIN: Primary | ICD-10-CM

## 2025-07-10 ASSESSMENT — ACTIVITIES OF DAILY LIVING (ADL)
LEFS_SCORE(%): INCOMPLETE
PLEASE_INDICATE_YOR_PRIMARY_REASON_FOR_REFERRAL_TO_THERAPY:: FOOT AND/OR ANKLE
ANY_OF_YOUR_USUAL_WORK,_HOUSEWORK_OR_SCHOOL_ACTIVITIES: MODERATE DIFFICULTY
LEFS_RAW_SCORE: INCOMPLETE

## 2025-07-10 NOTE — PROGRESS NOTES
"PHYSICAL THERAPY EVALUATION  Type of Visit: Evaluation       Fall Risk Screen:  Have you fallen 2 or more times in the past year?: No  Have you fallen and had an injury in the past year?: No    Subjective   Pt is a 74 year old female presenting to PT with chief complaint of right ankle, leg, and foot pain. She notes this has been ongoing for 2 months.   Pain is increased when she has to walk home from \"English class\" after a long period of rest, she also has increased pain right away in the morning, and with prolonged standing. She has been managing her pain with elevation, ice, and compression. She notes her pain does not wake her up at night. She denies NT into her right foot/LE.         Presenting condition or subjective complaint: right shoulder pain  Date of onset: 06/19/25 (refreral)    Relevant medical history:     Past Medical History:   Diagnosis Date    Hyperlipidemia LDL goal <100     Hypothyroidism     Incidental lung nodule, > 3mm and < 8mm 10/2024    on ct for sob, to fu 6 to 12 months    Osteoarthritis     SOB (shortness of breath)     echo 11/2024 nl; also ct chest no pe and nl done 10/2024       Dates & types of surgery:    Past Surgical History:   Procedure Laterality Date    HYSTERECTOMY         Prior diagnostic imaging/testing results:     NA  Prior therapy history for the same diagnosis, illness or injury: No      Prior Level of Function  Transfers: Independent  Ambulation: Independent  ADL: Independent  IADL: Housekeeping, Meal preparation    Living Environment  Social support: Alone   Type of home: Apartment/condo   Stairs to enter the home: No       Ramp: No   Stairs inside the home: No       Help at home: None  Equipment owned: Straight Cane     Employment: No    Hobbies/Interests:      Patient goals for therapy: putting on bra and reaching overhead and putting jacket on walking without pain    Pain assessment: Location: platar aspect of foot and lateral aspect of ankle/Rating: pain at worst " is 10/10, at best it is 5/10     Objective   FOOT/ANKLE EVALUATION  OBSERVATION: Pt sits comfortably in eval room     INTEGUMENTARY (edema/Figure-8, incisions): B pitting edema, pt notes this always happens when the weather is warmer       GAIT:   Gait Deviations: Ambulation with antalgic gait pattern, guarding R LE.       ROM:   Ankle Dorsiflexion: 0  Ankle Plantarflexion: 45  Ankle Inversion: 15  Ankle Eversion: 5  Great Toe Extension: 45    STRENGTH:   Ankle Dorsiflexion: strong and slightly painful  Ankle Plantarflexion: strong and slightly painful  Ankle Inversion: strong and slightly painful  Ankle Eversion: strong and slightly painful    SPECIAL TESTS:   Anterior drawer: -  Johnny Test: -  Squeeze Test: -  Talar Tilt: -  Deltoid Ligament: -  Heel Tap: -    PALPATION: pain in right medial calcaneal tubercle, pain in GS complex, pain along peroneal group and along lateral malleoli    JOINT MOBILITY:   Ankle Joint Mobility:  WFL    FUNCTIONAL STRENGTH:   Squat: pt leans heavily toward L LE to offload R LE, seeks UE support without cueing   SL Squat: pt unable to perform due to pain  SLS: pt notes increased pain, even with UE support  DL HR: Pt notes increased pain  SL HR: pt unable to perform due to pain      Assessment & Plan   CLINICAL IMPRESSIONS  Medical Diagnosis: Right foot pain  Pain of left lower leg    Treatment Diagnosis: pain of right foot and right lower leg   Impression/Assessment:   Pt is a 74 year old female presenting to PT with chief complaint of right ankle, leg, and foot pain. The patient presents with decreased ROM, decreased strength, and increased pain which inhibits their ability to perform ADLs and walk as desired. The patient tolerated the session well.  HEP was initiated, pt was educated on performing within her pain limits. The patient will continue to benefit from skilled PT for improved strength, ROM, and to decrease pain.      Clinical Decision Making (Complexity):  Clinical  Presentation: Stable/Uncomplicated  Clinical Presentation Rationale: based on medical and personal factors listed in PT evaluation  Clinical Decision Making (Complexity): Low complexity    PLAN OF CARE  Treatment Interventions:  Modalities: Cupping, Dry Needling, Iontophoresis, Ultrasound  Interventions: Gait Training, Manual Therapy, Neuromuscular Re-education, Therapeutic Activity, Therapeutic Exercise, Self-Care/Home Management    Long Term Goals     PT Goal 1  Goal Identifier: walking tolerance  Goal Description: pt will ambulate 45 minutes with 0/10 pain  Rationale: to maximize safety and independence within the community;to maximize safety and independence with transportation;to maximize safety and independence with performance of ADLs and functional tasks  Goal Progress: baseline: pt notes increased pain with walking for 10 minutes  Target Date: 10/07/25  PT Goal 2  Goal Identifier: functional strength  Goal Description: pt will perform 5 reps SL HR  Rationale: to maximize safety and independence with performance of ADLs and functional tasks  Goal Progress: baseline: pt is unabelt to perform due to pain  Target Date: 10/07/25      Frequency of Treatment: 1X per week decreasing to 1X every other week as needed  Duration of Treatment: 2-3 months    Education Assessment:   Learner/Method: Patient;No Barriers to Learning  Education Comments: pt verbalizes understanding of educatoin provided    Risks and benefits of evaluation/treatment have been explained.   Patient/Family/caregiver agrees with Plan of Care.     Evaluation Time:     PT Eval, Low Complexity Minutes (61543): 12     Signing Clinician: JUAQUIN Barrera [facility]                                                                                   OUTPATIENT PHYSICAL THERAPY      PLAN OF TREATMENT FOR OUTPATIENT REHABILITATION   Patient's Last Name, First Name, Marixa Lindsey YOB: 1950    Provider's Name   [facility]   Medical Record No.  [id number]     Onset Date: 06/19/25 (refreral)  Start of Care Date: 07/10/25     Medical Diagnosis:  Right foot pain  Pain of left lower leg      PT Treatment Diagnosis:  pain of right foot and right lower leg Plan of Treatment  Frequency/Duration: 1X per week decreasing to 1X every other week as needed/ 2-3 months    Certification date from 07/10/25 to 10/07/25         See note for plan of treatment details and functional goals     Tania Duffy, PT                         I CERTIFY THE NEED FOR THESE SERVICES FURNISHED UNDER        THIS PLAN OF TREATMENT AND WHILE UNDER MY CARE     (Physician attestation of this document indicates review and certification of the therapy plan).              Referring Provider:  Molina Webb    Initial Assessment  See Epic Evaluation- Start of Care Date: 07/10/25

## 2025-07-21 ENCOUNTER — LAB (OUTPATIENT)
Dept: LAB | Facility: CLINIC | Age: 75
End: 2025-07-21
Payer: MEDICARE

## 2025-07-21 DIAGNOSIS — E03.9 HYPOTHYROIDISM, UNSPECIFIED TYPE: ICD-10-CM

## 2025-07-21 LAB — TSH SERPL DL<=0.005 MIU/L-ACNC: 2.44 UIU/ML (ref 0.3–4.2)

## 2025-07-21 PROCEDURE — 36415 COLL VENOUS BLD VENIPUNCTURE: CPT

## 2025-07-21 PROCEDURE — 84443 ASSAY THYROID STIM HORMONE: CPT
